# Patient Record
Sex: FEMALE | Race: BLACK OR AFRICAN AMERICAN | NOT HISPANIC OR LATINO | Employment: OTHER | URBAN - METROPOLITAN AREA
[De-identification: names, ages, dates, MRNs, and addresses within clinical notes are randomized per-mention and may not be internally consistent; named-entity substitution may affect disease eponyms.]

---

## 2023-03-24 PROBLEM — M25.551 HIP PAIN, RIGHT: Status: ACTIVE | Noted: 2023-03-24

## 2023-03-24 PROBLEM — T78.40XA ALLERGIC REACTION: Status: ACTIVE | Noted: 2023-03-24

## 2023-03-24 PROBLEM — H25.813 MIXED TYPE AGE-RELATED CATARACT, BOTH EYES: Status: ACTIVE | Noted: 2023-03-24

## 2023-03-24 PROBLEM — K64.5 THROMBOSED EXTERNAL HEMORRHOIDS: Status: ACTIVE | Noted: 2023-03-24

## 2023-03-24 PROBLEM — M70.71 BURSITIS OF HIP, RIGHT: Status: ACTIVE | Noted: 2023-03-24

## 2023-03-24 PROBLEM — E78.5 HYPERLIPIDEMIA: Status: ACTIVE | Noted: 2023-03-24

## 2023-03-24 PROBLEM — J45.909 ASTHMA (HHS-HCC): Status: ACTIVE | Noted: 2023-03-24

## 2023-03-24 PROBLEM — N39.0 UTI (URINARY TRACT INFECTION): Status: ACTIVE | Noted: 2023-03-24

## 2023-03-24 PROBLEM — D72.829 LEUKOCYTOSIS: Status: ACTIVE | Noted: 2023-03-24

## 2023-03-24 PROBLEM — M25.569 JOINT PAIN, KNEE: Status: ACTIVE | Noted: 2023-03-24

## 2023-03-24 PROBLEM — Z96.659 S/P TOTAL KNEE REPLACEMENT: Status: ACTIVE | Noted: 2023-03-24

## 2023-03-24 PROBLEM — M47.816 DEGENERATIVE JOINT DISEASE (DJD) OF LUMBAR SPINE: Status: ACTIVE | Noted: 2023-03-24

## 2023-03-24 PROBLEM — Z98.41 CATARACT EXTRACTION STATUS OF RIGHT EYE: Status: ACTIVE | Noted: 2023-03-24

## 2023-03-24 PROBLEM — M79.605 PAIN IN BOTH LOWER EXTREMITIES: Status: ACTIVE | Noted: 2023-03-24

## 2023-03-24 PROBLEM — M81.0 OSTEOPOROSIS, SENILE: Status: ACTIVE | Noted: 2023-03-24

## 2023-03-24 PROBLEM — N18.31 CHRONIC KIDNEY DISEASE, STAGE 3A (MULTI): Status: ACTIVE | Noted: 2023-03-24

## 2023-03-24 PROBLEM — R73.9 HYPERGLYCEMIA: Status: ACTIVE | Noted: 2023-03-24

## 2023-03-24 PROBLEM — E21.0 HYPERPARATHYROIDISM, PRIMARY (MULTI): Status: ACTIVE | Noted: 2023-03-24

## 2023-03-24 PROBLEM — E83.52 HYPERCALCEMIA: Status: ACTIVE | Noted: 2023-03-24

## 2023-03-24 PROBLEM — M19.90 ARTHRITIS: Status: ACTIVE | Noted: 2023-03-24

## 2023-03-24 PROBLEM — R42 DIZZINESS: Status: ACTIVE | Noted: 2023-03-24

## 2023-03-24 PROBLEM — H91.90 HEARING LOSS: Status: ACTIVE | Noted: 2023-03-24

## 2023-03-24 PROBLEM — B96.81 HELICOBACTER PYLORI GASTRITIS: Status: ACTIVE | Noted: 2023-03-24

## 2023-03-24 PROBLEM — M25.561 RIGHT KNEE PAIN: Status: ACTIVE | Noted: 2023-03-24

## 2023-03-24 PROBLEM — Z98.42 CATARACT EXTRACTION STATUS OF LEFT EYE: Status: ACTIVE | Noted: 2023-03-24

## 2023-03-24 PROBLEM — M79.604 PAIN IN BOTH LOWER EXTREMITIES: Status: ACTIVE | Noted: 2023-03-24

## 2023-03-24 PROBLEM — R10.9 ABDOMINAL PAIN: Status: ACTIVE | Noted: 2023-03-24

## 2023-03-24 PROBLEM — E66.01 CLASS 2 SEVERE OBESITY DUE TO EXCESS CALORIES WITH SERIOUS COMORBIDITY IN ADULT (MULTI): Status: ACTIVE | Noted: 2023-03-24

## 2023-03-24 PROBLEM — I12.9 STAGE 2 CHRONIC KIDNEY DISEASE DUE TO BENIGN HYPERTENSION: Status: ACTIVE | Noted: 2023-03-24

## 2023-03-24 PROBLEM — K21.9 ESOPHAGEAL REFLUX: Status: ACTIVE | Noted: 2023-03-24

## 2023-03-24 PROBLEM — Z96.1 PSEUDOPHAKIA OF BOTH EYES: Status: ACTIVE | Noted: 2023-03-24

## 2023-03-24 PROBLEM — R92.1 BREAST CALCIFICATION, LEFT: Status: ACTIVE | Noted: 2023-03-24

## 2023-03-24 PROBLEM — N18.2 STAGE 2 CHRONIC KIDNEY DISEASE DUE TO BENIGN HYPERTENSION: Status: ACTIVE | Noted: 2023-03-24

## 2023-03-24 PROBLEM — I10 ESSENTIAL HYPERTENSION: Status: ACTIVE | Noted: 2023-03-24

## 2023-03-24 PROBLEM — Z97.3 WEARS GLASSES: Status: ACTIVE | Noted: 2023-03-24

## 2023-03-24 PROBLEM — D64.9 ANEMIA: Status: ACTIVE | Noted: 2023-03-24

## 2023-03-24 PROBLEM — M25.562 LEFT KNEE PAIN: Status: ACTIVE | Noted: 2023-03-24

## 2023-03-24 PROBLEM — R79.9 ABNORMAL BLOOD CHEMISTRY: Status: ACTIVE | Noted: 2023-03-24

## 2023-03-24 PROBLEM — E55.9 VITAMIN D DEFICIENCY: Status: ACTIVE | Noted: 2023-03-24

## 2023-03-24 PROBLEM — H93.8X1 FULLNESS IN EAR, RIGHT: Status: ACTIVE | Noted: 2023-03-24

## 2023-03-24 PROBLEM — R70.0 ELEVATED ERYTHROCYTE SEDIMENTATION RATE: Status: ACTIVE | Noted: 2023-03-24

## 2023-03-24 PROBLEM — H90.3 BILATERAL SENSORINEURAL HEARING LOSS: Status: ACTIVE | Noted: 2023-03-24

## 2023-03-24 PROBLEM — R19.7 DIARRHEA: Status: ACTIVE | Noted: 2023-03-24

## 2023-03-24 PROBLEM — H04.123 DRY EYES: Status: ACTIVE | Noted: 2023-03-24

## 2023-03-24 PROBLEM — E66.812 CLASS 2 SEVERE OBESITY DUE TO EXCESS CALORIES WITH SERIOUS COMORBIDITY IN ADULT: Status: ACTIVE | Noted: 2023-03-24

## 2023-03-24 PROBLEM — H52.7 REFRACTIVE ERROR: Status: ACTIVE | Noted: 2023-03-24

## 2023-03-24 PROBLEM — R26.81 UNSTEADY GAIT: Status: ACTIVE | Noted: 2023-03-24

## 2023-03-24 PROBLEM — M17.0 DEGENERATIVE ARTHRITIS OF KNEE, BILATERAL: Status: ACTIVE | Noted: 2023-03-24

## 2023-03-24 PROBLEM — H53.8 BLURRED VISION, BILATERAL: Status: ACTIVE | Noted: 2023-03-24

## 2023-03-24 PROBLEM — R60.9 EDEMA: Status: ACTIVE | Noted: 2023-03-24

## 2023-03-24 PROBLEM — R13.10 DYSPHAGIA: Status: ACTIVE | Noted: 2023-03-24

## 2023-03-24 PROBLEM — R13.10 FOOD STICKS ON SWALLOWING: Status: ACTIVE | Noted: 2023-03-24

## 2023-03-24 PROBLEM — K29.70 HELICOBACTER PYLORI GASTRITIS: Status: ACTIVE | Noted: 2023-03-24

## 2023-03-24 PROBLEM — K56.609 SBO (SMALL BOWEL OBSTRUCTION) (MULTI): Status: ACTIVE | Noted: 2023-03-24

## 2023-03-24 PROBLEM — R74.8 ELEVATED ALKALINE PHOSPHATASE LEVEL: Status: ACTIVE | Noted: 2023-03-24

## 2023-03-24 RX ORDER — PRAVASTATIN SODIUM 40 MG/1
1 TABLET ORAL NIGHTLY
COMMUNITY
Start: 2012-10-18 | End: 2023-05-12

## 2023-03-24 RX ORDER — AMLODIPINE BESYLATE 10 MG/1
1 TABLET ORAL DAILY
COMMUNITY
Start: 2021-11-09 | End: 2023-06-06

## 2023-03-24 RX ORDER — ALBUTEROL SULFATE 90 UG/1
1-2 AEROSOL, METERED RESPIRATORY (INHALATION)
COMMUNITY
Start: 2012-07-05 | End: 2023-10-20

## 2023-03-24 RX ORDER — OLMESARTAN MEDOXOMIL 40 MG/1
40 TABLET ORAL DAILY
COMMUNITY
Start: 2021-10-12 | End: 2023-07-24 | Stop reason: SDUPTHER

## 2023-03-24 RX ORDER — HYDROCORTISONE 25 MG/G
CREAM TOPICAL
COMMUNITY
Start: 2021-03-26 | End: 2023-03-27 | Stop reason: ALTCHOICE

## 2023-03-24 RX ORDER — ASPIRIN 81 MG/1
1 TABLET ORAL DAILY
COMMUNITY

## 2023-03-24 RX ORDER — FUROSEMIDE 40 MG/1
1 TABLET ORAL 2 TIMES DAILY
COMMUNITY
Start: 2020-12-16 | End: 2023-10-27 | Stop reason: SDUPTHER

## 2023-03-27 ENCOUNTER — OFFICE VISIT (OUTPATIENT)
Dept: PRIMARY CARE | Facility: CLINIC | Age: 88
End: 2023-03-27
Payer: MEDICARE

## 2023-03-27 VITALS
OXYGEN SATURATION: 97 % | HEART RATE: 95 BPM | DIASTOLIC BLOOD PRESSURE: 80 MMHG | SYSTOLIC BLOOD PRESSURE: 164 MMHG | BODY MASS INDEX: 44.7 KG/M2 | HEIGHT: 62 IN

## 2023-03-27 DIAGNOSIS — Z00.00 MEDICARE ANNUAL WELLNESS VISIT, SUBSEQUENT: Primary | ICD-10-CM

## 2023-03-27 DIAGNOSIS — E66.01 MORBID OBESITY WITH BMI OF 40.0-44.9, ADULT (MULTI): ICD-10-CM

## 2023-03-27 DIAGNOSIS — Z13.89 ENCOUNTER FOR SCREENING FOR OTHER DISORDER: ICD-10-CM

## 2023-03-27 DIAGNOSIS — R35.0 URINARY FREQUENCY: ICD-10-CM

## 2023-03-27 DIAGNOSIS — E21.0 HYPERPARATHYROIDISM, PRIMARY (MULTI): ICD-10-CM

## 2023-03-27 DIAGNOSIS — D64.9 ANEMIA, UNSPECIFIED TYPE: ICD-10-CM

## 2023-03-27 DIAGNOSIS — Z78.0 POSTMENOPAUSAL STATE: ICD-10-CM

## 2023-03-27 DIAGNOSIS — I10 ESSENTIAL HYPERTENSION: ICD-10-CM

## 2023-03-27 DIAGNOSIS — Z00.00 ROUTINE GENERAL MEDICAL EXAMINATION AT HEALTH CARE FACILITY: ICD-10-CM

## 2023-03-27 DIAGNOSIS — R74.8 ELEVATED ALKALINE PHOSPHATASE LEVEL: ICD-10-CM

## 2023-03-27 PROBLEM — K56.609 SBO (SMALL BOWEL OBSTRUCTION) (MULTI): Status: RESOLVED | Noted: 2023-03-24 | Resolved: 2023-03-27

## 2023-03-27 PROBLEM — E66.812 CLASS 2 SEVERE OBESITY DUE TO EXCESS CALORIES WITH SERIOUS COMORBIDITY IN ADULT: Status: RESOLVED | Noted: 2023-03-24 | Resolved: 2023-03-27

## 2023-03-27 PROBLEM — M79.604 PAIN IN BOTH LOWER EXTREMITIES: Status: RESOLVED | Noted: 2023-03-24 | Resolved: 2023-03-27

## 2023-03-27 PROBLEM — M79.605 PAIN IN BOTH LOWER EXTREMITIES: Status: RESOLVED | Noted: 2023-03-24 | Resolved: 2023-03-27

## 2023-03-27 PROBLEM — N18.31 CHRONIC KIDNEY DISEASE, STAGE 3A (MULTI): Status: RESOLVED | Noted: 2023-03-24 | Resolved: 2023-03-27

## 2023-03-27 PROBLEM — M81.0 OSTEOPOROSIS, SENILE: Status: RESOLVED | Noted: 2023-03-24 | Resolved: 2023-03-27

## 2023-03-27 PROBLEM — N39.0 UTI (URINARY TRACT INFECTION): Status: RESOLVED | Noted: 2023-03-24 | Resolved: 2023-03-27

## 2023-03-27 PROCEDURE — 99397 PER PM REEVAL EST PAT 65+ YR: CPT | Performed by: PHYSICIAN ASSISTANT

## 2023-03-27 PROCEDURE — 1036F TOBACCO NON-USER: CPT | Performed by: PHYSICIAN ASSISTANT

## 2023-03-27 PROCEDURE — 1123F ACP DISCUSS/DSCN MKR DOCD: CPT | Performed by: PHYSICIAN ASSISTANT

## 2023-03-27 PROCEDURE — 1033F TOBACCO NONSMOKER NOR 2NDHND: CPT | Performed by: PHYSICIAN ASSISTANT

## 2023-03-27 PROCEDURE — G0439 PPPS, SUBSEQ VISIT: HCPCS | Performed by: PHYSICIAN ASSISTANT

## 2023-03-27 PROCEDURE — 85025 COMPLETE CBC W/AUTO DIFF WBC: CPT

## 2023-03-27 PROCEDURE — 1170F FXNL STATUS ASSESSED: CPT | Performed by: PHYSICIAN ASSISTANT

## 2023-03-27 PROCEDURE — 1160F RVW MEDS BY RX/DR IN RCRD: CPT | Performed by: PHYSICIAN ASSISTANT

## 2023-03-27 PROCEDURE — 36415 COLL VENOUS BLD VENIPUNCTURE: CPT

## 2023-03-27 PROCEDURE — G0444 DEPRESSION SCREEN ANNUAL: HCPCS | Performed by: PHYSICIAN ASSISTANT

## 2023-03-27 PROCEDURE — 3077F SYST BP >= 140 MM HG: CPT | Performed by: PHYSICIAN ASSISTANT

## 2023-03-27 PROCEDURE — 80053 COMPREHEN METABOLIC PANEL: CPT

## 2023-03-27 PROCEDURE — 84080 ASSAY ALKALINE PHOSPHATASES: CPT

## 2023-03-27 PROCEDURE — 3079F DIAST BP 80-89 MM HG: CPT | Performed by: PHYSICIAN ASSISTANT

## 2023-03-27 PROCEDURE — 1159F MED LIST DOCD IN RCRD: CPT | Performed by: PHYSICIAN ASSISTANT

## 2023-03-27 ASSESSMENT — ENCOUNTER SYMPTOMS
LOSS OF SENSATION IN FEET: 1
OCCASIONAL FEELINGS OF UNSTEADINESS: 1
DEPRESSION: 0

## 2023-03-27 ASSESSMENT — ACTIVITIES OF DAILY LIVING (ADL)
MANAGING_FINANCES: TOTAL CARE
TAKING_MEDICATION: INDEPENDENT
BATHING: NEEDS ASSISTANCE
DOING_HOUSEWORK: TOTAL CARE
GROCERY_SHOPPING: TOTAL CARE
DRESSING: NEEDS ASSISTANCE

## 2023-03-27 ASSESSMENT — PATIENT HEALTH QUESTIONNAIRE - PHQ9
1. LITTLE INTEREST OR PLEASURE IN DOING THINGS: NOT AT ALL
SUM OF ALL RESPONSES TO PHQ9 QUESTIONS 1 AND 2: 0
2. FEELING DOWN, DEPRESSED OR HOPELESS: NOT AT ALL

## 2023-03-27 NOTE — PROGRESS NOTES
Subjective   Reason for Visit: Kelly Haji is an 88 y.o. female here for a Medicare Wellness visit.   Past Medical, Surgical, and Family History reviewed and updated in chart.         HPI 87 year old female presenting with her daughter for Medicare wellness / CPE visit. Generally she's doing okay. She is c/o:    1. Headache: Present for a few days, described as a vague, annoying headache. It was gradual in onset, mainly right sided. 5-6/10 in severity at its worst, not a thunderclap HA or worst HA of her life. She admits to having some associated sinus congestion and pressure, but no rhinorrhea. She took a few extra strength Tylenol this AM, it did provide minimal relief. She also did try Advil cold & sinus, which provided even better relief than Tylenol. No changes in vision or hearing, facial drooping, speech slurring, gait disturbance, nausea, photophobia or phonophobia.     2. Urinary frequency, urinary urgency, and burning with urination: present for the past 2-3 days. No other UTI symptoms: fevers, chills, pelvic pressure, hematuria, dysuria.     HTN/HLD: Compliant with amlodipine 10 mg, olmesartan 40 mg, furosemide 40 mg, pravastatin 40 mg, 81 mg ASA.  She does not check BP at home.  Denies any headache, dizziness, chest pain, SOB/KING. She is not eating a lot of snacks, soups, or processed foods at home.     Edema: On furosemide 40 mg, but is not always compliant with taking it 2x daily.  Follows with cardiology, last seen 1/18/2023.  No med changes or testing ordered.    Hyperparathyroidism: Labs 11/7/2022 showed hypercalcemia (11.4) and elevated alkaline phosphatase.  Not currently following with endocrinology, last seen 2019.  At that time it was recommended that she increase hydration, take Lasix to help bring down hypocalcemia, and continue monitoring.  Last DEXA 2019 showed normal bone density, no history of osteopenia or osteoporosis.    Health maintenance:  Immunizations:  -Flu: UTD  -COVID:  "Received 3 doses  -Pneumococcal: UTD  -Shingrix: Recommended, obtain from local pharmacy  DEXA due (last 5/29/2019)- ordered 3/27/23. Alk phos level and calcium have been elevated     Patient Care Team:  Melba Reich PA-C as PCP - General  Melba Reich PA-C as PCP - [employer] PCP     Objective   Vitals:  /80   Pulse 95   Ht 1.575 m (5' 2\")   SpO2 97%   BMI 44.70 kg/m²       Physical Exam  Vitals reviewed.   Constitutional:       General: She is not in acute distress.     Appearance: Normal appearance. She is not ill-appearing.   HENT:      Head: Normocephalic and atraumatic.   Eyes:      General: No scleral icterus.     Extraocular Movements: Extraocular movements intact.      Conjunctiva/sclera: Conjunctivae normal.      Pupils: Pupils are equal, round, and reactive to light.   Cardiovascular:      Rate and Rhythm: Normal rate and regular rhythm.      Heart sounds: Normal heart sounds. No murmur heard.     No friction rub. No gallop.   Pulmonary:      Effort: Pulmonary effort is normal. No respiratory distress.      Breath sounds: Normal breath sounds. No stridor. No wheezing, rhonchi or rales.   Musculoskeletal:      Cervical back: Normal range of motion.      Right lower leg: No edema.      Left lower leg: No edema.      Comments: Resting in wheelchair   Skin:     General: Skin is warm and dry.   Neurological:      Mental Status: She is alert and oriented to person, place, and time. Mental status is at baseline.      Cranial Nerves: No cranial nerve deficit.      Gait: Gait normal.   Psychiatric:         Mood and Affect: Mood normal.         Behavior: Behavior normal.         Assessment/Plan   Problem List Items Addressed This Visit       Anemia    Current Assessment & Plan     Stable.  Encouraged iron 65 mg or ferrous sulfate 325 mg supplement once daily.  Take OTC stool softener if constipation occurs with supplementation.         Relevant Orders    CBC and Auto Differential " (Completed)    Elevated alkaline phosphatase level    Current Assessment & Plan     Elevated alk phos, normal GGT, elevated calcium.  Bone specific alkaline phosphatase and DEXA bone scan ordered         Essential hypertension    Current Assessment & Plan     BP above goal at 164/80 and on recheck at 152/80.  Continue amlodipine 10 mg, olmesartan 40 mg, furosemide 40 mg.  Instructed to take furosemide more regularly.  Encourage strict DASH diet, drinking adequate water, and regular exercise.  Follow-up with cardiology         Relevant Orders    Comprehensive metabolic panel (Completed)    Hyperparathyroidism, primary (CMS/[facility])    Current Assessment & Plan     CMP, bone specific alkaline phosphatase, DEXA bone scan ordered.  Referral to endocrinology will be placed if elevations persist         Urinary frequency    Current Assessment & Plan     Urinalysis and urine culture ordered.  We will treat as clinically indicated.  Drink adequate water         Relevant Orders    Urinalysis with Reflex Microscopic    Urine Culture     Other Visit Diagnoses       Medicare annual wellness visit, subsequent    -  Primary    Routine general medical examination at health care facility        Encounter for screening for other disorder        Postmenopausal state        Relevant Orders    XR DEXA bone density    Morbid obesity with BMI of 40.0-44.9, adult (CMS/[facility])

## 2023-03-28 DIAGNOSIS — R74.8 HIGH SERUM BONE-SPECIFIC ALKALINE PHOSPHATASE: Primary | ICD-10-CM

## 2023-03-28 DIAGNOSIS — E83.52 HYPERCALCEMIA: ICD-10-CM

## 2023-03-28 LAB
ALANINE AMINOTRANSFERASE (SGPT) (U/L) IN SER/PLAS: 11 U/L (ref 7–45)
ALBUMIN (G/DL) IN SER/PLAS: 4.3 G/DL (ref 3.4–5)
ALKALINE PHOSPHATASE (U/L) IN SER/PLAS: 175 U/L (ref 33–136)
ANION GAP IN SER/PLAS: 13 MMOL/L (ref 10–20)
ASPARTATE AMINOTRANSFERASE (SGOT) (U/L) IN SER/PLAS: 11 U/L (ref 9–39)
BASOPHILS (10*3/UL) IN BLOOD BY AUTOMATED COUNT: 0.04 X10E9/L (ref 0–0.1)
BASOPHILS/100 LEUKOCYTES IN BLOOD BY AUTOMATED COUNT: 0.4 % (ref 0–2)
BILIRUBIN TOTAL (MG/DL) IN SER/PLAS: 0.3 MG/DL (ref 0–1.2)
CALCIUM (MG/DL) IN SER/PLAS: 11.5 MG/DL (ref 8.6–10.6)
CARBON DIOXIDE, TOTAL (MMOL/L) IN SER/PLAS: 26 MMOL/L (ref 21–32)
CHLORIDE (MMOL/L) IN SER/PLAS: 106 MMOL/L (ref 98–107)
CREATININE (MG/DL) IN SER/PLAS: 0.82 MG/DL (ref 0.5–1.05)
EOSINOPHILS (10*3/UL) IN BLOOD BY AUTOMATED COUNT: 0.2 X10E9/L (ref 0–0.4)
EOSINOPHILS/100 LEUKOCYTES IN BLOOD BY AUTOMATED COUNT: 1.9 % (ref 0–6)
ERYTHROCYTE DISTRIBUTION WIDTH (RATIO) BY AUTOMATED COUNT: 17.5 % (ref 11.5–14.5)
ERYTHROCYTE MEAN CORPUSCULAR HEMOGLOBIN CONCENTRATION (G/DL) BY AUTOMATED: 32.4 G/DL (ref 32–36)
ERYTHROCYTE MEAN CORPUSCULAR VOLUME (FL) BY AUTOMATED COUNT: 72 FL (ref 80–100)
ERYTHROCYTES (10*6/UL) IN BLOOD BY AUTOMATED COUNT: 4.92 X10E12/L (ref 4–5.2)
GFR FEMALE: 70 ML/MIN/1.73M2
GLUCOSE (MG/DL) IN SER/PLAS: 99 MG/DL (ref 74–99)
HEMATOCRIT (%) IN BLOOD BY AUTOMATED COUNT: 35.5 % (ref 36–46)
HEMOGLOBIN (G/DL) IN BLOOD: 11.5 G/DL (ref 12–16)
IMMATURE GRANULOCYTES/100 LEUKOCYTES IN BLOOD BY AUTOMATED COUNT: 0.3 % (ref 0–0.9)
LEUKOCYTES (10*3/UL) IN BLOOD BY AUTOMATED COUNT: 10.5 X10E9/L (ref 4.4–11.3)
LYMPHOCYTES (10*3/UL) IN BLOOD BY AUTOMATED COUNT: 2.29 X10E9/L (ref 0.8–3)
LYMPHOCYTES/100 LEUKOCYTES IN BLOOD BY AUTOMATED COUNT: 21.9 % (ref 13–44)
MONOCYTES (10*3/UL) IN BLOOD BY AUTOMATED COUNT: 0.55 X10E9/L (ref 0.05–0.8)
MONOCYTES/100 LEUKOCYTES IN BLOOD BY AUTOMATED COUNT: 5.3 % (ref 2–10)
NEUTROPHILS (10*3/UL) IN BLOOD BY AUTOMATED COUNT: 7.36 X10E9/L (ref 1.6–5.5)
NEUTROPHILS/100 LEUKOCYTES IN BLOOD BY AUTOMATED COUNT: 70.2 % (ref 40–80)
NRBC (PER 100 WBCS) BY AUTOMATED COUNT: 0 /100 WBC (ref 0–0)
PLATELETS (10*3/UL) IN BLOOD AUTOMATED COUNT: 379 X10E9/L (ref 150–450)
POTASSIUM (MMOL/L) IN SER/PLAS: 4.4 MMOL/L (ref 3.5–5.3)
PROTEIN TOTAL: 7.5 G/DL (ref 6.4–8.2)
SODIUM (MMOL/L) IN SER/PLAS: 141 MMOL/L (ref 136–145)
UREA NITROGEN (MG/DL) IN SER/PLAS: 10 MG/DL (ref 6–23)

## 2023-03-29 PROBLEM — R35.0 URINARY FREQUENCY: Status: ACTIVE | Noted: 2023-03-29

## 2023-03-29 PROBLEM — R51.9 HEADACHE DISORDER: Status: ACTIVE | Noted: 2023-03-29

## 2023-03-29 PROBLEM — G44.209 ACUTE TENSION-TYPE HEADACHE: Status: ACTIVE | Noted: 2023-03-29

## 2023-03-29 NOTE — ASSESSMENT & PLAN NOTE
Urinalysis and urine culture ordered.  We will treat as clinically indicated.  Drink adequate water

## 2023-03-29 NOTE — ASSESSMENT & PLAN NOTE
Elevated alk phos, normal GGT, elevated calcium.  Bone specific alkaline phosphatase and DEXA bone scan ordered

## 2023-03-29 NOTE — ASSESSMENT & PLAN NOTE
Stable.  Encouraged iron 65 mg or ferrous sulfate 325 mg supplement once daily.  Take OTC stool softener if constipation occurs with supplementation.

## 2023-03-29 NOTE — ASSESSMENT & PLAN NOTE
BP above goal at 164/80 and on recheck at 152/80.  Continue amlodipine 10 mg, olmesartan 40 mg, furosemide 40 mg.  Instructed to take furosemide more regularly.  Encourage strict DASH diet, drinking adequate water, and regular exercise.  Follow-up with cardiology

## 2023-03-29 NOTE — ASSESSMENT & PLAN NOTE
CMP, bone specific alkaline phosphatase, DEXA bone scan ordered.  Referral to endocrinology will be placed if elevations persist

## 2023-03-29 NOTE — ASSESSMENT & PLAN NOTE
Continue with Tylenol and ibuprofen as needed for headache.  Discussed strict precautions and to go to the ED if any neurodeficits occur.

## 2023-03-29 NOTE — ASSESSMENT & PLAN NOTE
Continue furosemide 40 mg twice daily.  Encourage compliance with taking furosemide.  Elevate lower extremities when not ambulating.  Been using compression stockings.  Cut back on any salt intake.

## 2023-04-01 LAB — BONE SPECIFIC ALKALINE PHOSPHATASE: 33.6 UG/L

## 2023-04-03 NOTE — RESULT ENCOUNTER NOTE
Labs show elevated calcium and bone-specific alkaline phosphatase. This could indicate a metabolic bone disorder. She needs to complete the DEXA bone scan, which was ordered at her last [facility]. I also want her to see a Rheumatologist for further evaluation. Increase water intake and take deep breaths.     CBC also showed mild iron deficiency anemia. Is she taking an iron supplement? If not, begin doing to. Take a stool softner with it if constipation occurs.

## 2023-04-04 ENCOUNTER — TELEPHONE (OUTPATIENT)
Dept: PRIMARY CARE | Facility: CLINIC | Age: 88
End: 2023-04-04
Payer: MEDICARE

## 2023-04-04 NOTE — TELEPHONE ENCOUNTER
Pt informed of results.    ----- Message from Melba Reich PA-C sent at 4/3/2023  4:43 PM EDT -----  Labs show elevated calcium and bone-specific alkaline phosphatase. This could indicate a metabolic bone disorder. She needs to complete the DEXA bone scan, which was ordered at her last vanessa. I also want her to see a Rheumatologist for further evaluation. Increase water intake and take deep breaths.     CBC also showed mild iron deficiency anemia. Is she taking an iron supplement? If not, begin doing to. Take a stool softner with it if constipation occurs.

## 2023-05-11 DIAGNOSIS — E78.5 HYPERLIPIDEMIA, UNSPECIFIED HYPERLIPIDEMIA TYPE: Primary | ICD-10-CM

## 2023-05-12 RX ORDER — PRAVASTATIN SODIUM 40 MG/1
TABLET ORAL
Qty: 90 TABLET | Refills: 0 | Status: SHIPPED | OUTPATIENT
Start: 2023-05-12 | End: 2023-08-10

## 2023-06-02 DIAGNOSIS — I10 ESSENTIAL HYPERTENSION: Primary | ICD-10-CM

## 2023-06-06 RX ORDER — AMLODIPINE BESYLATE 10 MG/1
TABLET ORAL
Qty: 90 TABLET | Refills: 0 | Status: SHIPPED | OUTPATIENT
Start: 2023-06-06 | End: 2023-07-24 | Stop reason: SDUPTHER

## 2023-06-13 ENCOUNTER — APPOINTMENT (OUTPATIENT)
Dept: PRIMARY CARE | Facility: CLINIC | Age: 88
End: 2023-06-13
Payer: MEDICARE

## 2023-06-30 ENCOUNTER — APPOINTMENT (OUTPATIENT)
Dept: PRIMARY CARE | Facility: CLINIC | Age: 88
End: 2023-06-30
Payer: MEDICARE

## 2023-07-24 ENCOUNTER — OFFICE VISIT (OUTPATIENT)
Dept: PRIMARY CARE | Facility: CLINIC | Age: 88
End: 2023-07-24
Payer: MEDICARE

## 2023-07-24 VITALS — SYSTOLIC BLOOD PRESSURE: 135 MMHG | HEART RATE: 93 BPM | OXYGEN SATURATION: 95 % | DIASTOLIC BLOOD PRESSURE: 67 MMHG

## 2023-07-24 DIAGNOSIS — E78.2 MIXED HYPERLIPIDEMIA: ICD-10-CM

## 2023-07-24 DIAGNOSIS — R73.9 HYPERGLYCEMIA: ICD-10-CM

## 2023-07-24 DIAGNOSIS — E21.0 HYPERPARATHYROIDISM, PRIMARY (MULTI): ICD-10-CM

## 2023-07-24 DIAGNOSIS — D64.9 ANEMIA, UNSPECIFIED TYPE: ICD-10-CM

## 2023-07-24 DIAGNOSIS — R60.0 LOWER EXTREMITY EDEMA: Primary | ICD-10-CM

## 2023-07-24 DIAGNOSIS — I10 ESSENTIAL HYPERTENSION: ICD-10-CM

## 2023-07-24 PROCEDURE — 1160F RVW MEDS BY RX/DR IN RCRD: CPT | Performed by: PHYSICIAN ASSISTANT

## 2023-07-24 PROCEDURE — 99213 OFFICE O/P EST LOW 20 MIN: CPT | Performed by: PHYSICIAN ASSISTANT

## 2023-07-24 PROCEDURE — 1036F TOBACCO NON-USER: CPT | Performed by: PHYSICIAN ASSISTANT

## 2023-07-24 PROCEDURE — 1159F MED LIST DOCD IN RCRD: CPT | Performed by: PHYSICIAN ASSISTANT

## 2023-07-24 PROCEDURE — 3078F DIAST BP <80 MM HG: CPT | Performed by: PHYSICIAN ASSISTANT

## 2023-07-24 PROCEDURE — 1126F AMNT PAIN NOTED NONE PRSNT: CPT | Performed by: PHYSICIAN ASSISTANT

## 2023-07-24 PROCEDURE — 3075F SYST BP GE 130 - 139MM HG: CPT | Performed by: PHYSICIAN ASSISTANT

## 2023-07-24 RX ORDER — AMLODIPINE BESYLATE 10 MG/1
10 TABLET ORAL DAILY
Qty: 90 TABLET | Refills: 1 | Status: SHIPPED | OUTPATIENT
Start: 2023-07-24 | End: 2024-03-04 | Stop reason: SDUPTHER

## 2023-07-24 RX ORDER — OLMESARTAN MEDOXOMIL 40 MG/1
40 TABLET ORAL DAILY
Qty: 90 TABLET | Refills: 1 | Status: SHIPPED | OUTPATIENT
Start: 2023-07-24 | End: 2024-02-05 | Stop reason: SDUPTHER

## 2023-07-24 NOTE — ASSESSMENT & PLAN NOTE
Continue Furosemide 40 twice daily. Continue leg elevation, compression stockings, and home PT. Encourage low-sodium diet. Follow with Cardiology.

## 2023-07-24 NOTE — PROGRESS NOTES
Subjective   Patient ID: Kelly Haji is a 88 y.o. female who presents for 4 month follow up.    HPI 89 yo female presenting with her daughter for a follow-up visit.  Overall she is doing well.    Bilateral leg edema: Taking Furosemide 40 mg twice daily. Pt is still having edema despite Furosemide and leg elevation. Prescription measured compression stockings and home PT (2x per week) ordered by Dr. Shipley. If edema still persists after PT, lymphedema pumps will be ordered.     HTN/HLD: Stable. Compliant with Amlodipine 10 mg, Olmesartan 40 mg, Pravastatin 10 mg, and ASA 81 mg. She does not monitor BP at home.  Denies any headache, dizziness, chest pain, SOB/KING, palpitations.     Hyperparathyroidism: Labs 3/27/23 showed hypercalcemia (11.5) and elevated alkaline phosphatase (175). Not currently following with endocrinology, last seen 2019.  At that time it was recommended that she increase hydration, take Lasix to help bring down hypocalcemia, and continue monitoring.  Last DEXA 2019 showed normal bone density, no history of osteopenia or osteoporosis.    B/k knee OA: She has had numerous treatments in the past including muscle relaxers, opioid pain revealers, Gabapentin, joint injections, Diclofenac gel, Meloxicam, Tramadol and yet nothing has provided relief to the patient. She has also had a total right knee replacement, which per patient, still gives her pain. She saw Pain Management via a virtual visit once on 4/23/20, however she has not followed up since then. At home she ambulates with use of a walker. She is asking for something for pain stronger than tylenol, explained to her that our office doesn't prescribe opioid therapies.      Health maintenance:  Immunizations:  -Flu: UTD  -Pneumococcal: UTD  -Shingrix: recommended from pharmacy  -Tdap: recommended from pharmacy  DEXA due (last 5/29/2019)- ordered 3/27/23. Alk phos level and calcium have been elevated     Last MCR/ CPE: 3/27/23 (MCR ADV)    12  points ROS was reviewed and negative except for those noted in the HPI.     Objective   /67   Pulse 93   SpO2 95%     Physical Exam  Vitals reviewed.   Constitutional:       General: She is not in acute distress.     Appearance: Normal appearance. She is not ill-appearing.   HENT:      Head: Normocephalic and atraumatic.   Eyes:      General: No scleral icterus.     Extraocular Movements: Extraocular movements intact.      Conjunctiva/sclera: Conjunctivae normal.      Pupils: Pupils are equal, round, and reactive to light.   Cardiovascular:      Rate and Rhythm: Normal rate and regular rhythm.      Heart sounds: Normal heart sounds. No murmur heard.     No friction rub. No gallop.   Pulmonary:      Effort: Pulmonary effort is normal. No respiratory distress.      Breath sounds: Normal breath sounds. No stridor. No wheezing, rhonchi or rales.   Musculoskeletal:      Cervical back: Normal range of motion.      Right lower leg: Edema (2+ pitting) present.      Left lower leg: Edema (2+ pitting) present.   Skin:     General: Skin is warm and dry.   Neurological:      Mental Status: She is alert and oriented to person, place, and time. Mental status is at baseline.      Cranial Nerves: No cranial nerve deficit.      Gait: Gait normal.   Psychiatric:         Mood and Affect: Mood normal.         Behavior: Behavior normal.         Assessment/Plan   Problem List Items Addressed This Visit       Anemia     Stable. Encouraged iron 65 mg or ferrous sulfate 325 mg supplement once daily. Take OTC stool softener if constipation occurs with supplementation. CBC w/ diff, ferritin, iron and TIBC ordered.         Relevant Orders    Iron and TIBC    Ferritin    Essential hypertension     Stable. Continue Amlodipine 10 mg, Olmesartan 40 mg, and ASA 81 mg. Recommend DASH diet.          Relevant Medications    olmesartan (BENIcar) 40 mg tablet    amLODIPine (Norvasc) 10 mg tablet    Other Relevant Orders    Comprehensive  metabolic panel    CBC and Auto Differential    Hyperglycemia     HbA1c 6.0% (8/11/22). HbA1c ordered. Recommend ADA diet.          Relevant Orders    Hemoglobin A1c    Hyperlipidemia     Stable. Continue Pravastatin 10 mg. Recommend Mediterranean diet. Lipid panel ordered.         Relevant Orders    Lipid panel    Hyperparathyroidism, primary (CMS/HCC)     Elevated calcium 11.5 (3/27/23) and elevated alkaline phosphatase 175 (3/27/23). CMP ordered.         Lower extremity edema - Primary     Continue Furosemide 40 twice daily. Continue leg elevation, compression stockings, and home PT. Encourage low-sodium diet. Follow with Cardiology.              Follow up in 4-5 months or sooner as needed

## 2023-07-24 NOTE — ASSESSMENT & PLAN NOTE
Stable. Encouraged iron 65 mg or ferrous sulfate 325 mg supplement once daily. Take OTC stool softener if constipation occurs with supplementation. CBC w/ diff, ferritin, iron and TIBC ordered.

## 2023-08-10 DIAGNOSIS — E78.5 HYPERLIPIDEMIA, UNSPECIFIED HYPERLIPIDEMIA TYPE: ICD-10-CM

## 2023-08-10 RX ORDER — PRAVASTATIN SODIUM 40 MG/1
TABLET ORAL
Qty: 90 TABLET | Refills: 1 | Status: SHIPPED | OUTPATIENT
Start: 2023-08-10 | End: 2024-02-13 | Stop reason: SDUPTHER

## 2023-08-29 ENCOUNTER — TELEPHONE (OUTPATIENT)
Dept: PRIMARY CARE | Facility: CLINIC | Age: 88
End: 2023-08-29
Payer: MEDICARE

## 2023-08-29 NOTE — TELEPHONE ENCOUNTER
Home care calling about pt bp was elevated 170/85, also pt had a fall and hit right knee, no injury. Also wants to know if you can write an rx for a hospital bed and mattress topper. So pt can get in and out of bed safely

## 2023-08-31 DIAGNOSIS — M25.551 HIP PAIN, RIGHT: ICD-10-CM

## 2023-08-31 DIAGNOSIS — R60.0 LOWER EXTREMITY EDEMA: ICD-10-CM

## 2023-08-31 DIAGNOSIS — M47.816 OSTEOARTHRITIS OF LUMBAR SPINE, UNSPECIFIED SPINAL OSTEOARTHRITIS COMPLICATION STATUS: Primary | ICD-10-CM

## 2023-10-20 DIAGNOSIS — J45.909 ASTHMA, UNSPECIFIED ASTHMA SEVERITY, UNSPECIFIED WHETHER COMPLICATED, UNSPECIFIED WHETHER PERSISTENT (HHS-HCC): Primary | ICD-10-CM

## 2023-10-20 RX ORDER — ALBUTEROL SULFATE 90 UG/1
AEROSOL, METERED RESPIRATORY (INHALATION)
Qty: 8.5 G | Refills: 3 | Status: SHIPPED | OUTPATIENT
Start: 2023-10-20

## 2023-10-27 DIAGNOSIS — R60.9 EDEMA: Primary | ICD-10-CM

## 2023-10-27 RX ORDER — FUROSEMIDE 40 MG/1
40 TABLET ORAL 2 TIMES DAILY
Qty: 90 TABLET | Refills: 3 | Status: SHIPPED | OUTPATIENT
Start: 2023-10-27 | End: 2024-04-26 | Stop reason: SDUPTHER

## 2024-01-17 ENCOUNTER — APPOINTMENT (OUTPATIENT)
Dept: CARDIOLOGY | Facility: CLINIC | Age: 89
End: 2024-01-17
Payer: MEDICARE

## 2024-01-17 ENCOUNTER — APPOINTMENT (OUTPATIENT)
Dept: PRIMARY CARE | Facility: CLINIC | Age: 89
End: 2024-01-17
Payer: MEDICARE

## 2024-02-05 DIAGNOSIS — I10 ESSENTIAL HYPERTENSION: ICD-10-CM

## 2024-02-05 RX ORDER — OLMESARTAN MEDOXOMIL 40 MG/1
40 TABLET ORAL DAILY
Qty: 90 TABLET | Refills: 1 | Status: SHIPPED | OUTPATIENT
Start: 2024-02-05 | End: 2024-04-22 | Stop reason: SDUPTHER

## 2024-02-13 DIAGNOSIS — E78.5 HYPERLIPIDEMIA, UNSPECIFIED HYPERLIPIDEMIA TYPE: ICD-10-CM

## 2024-02-13 RX ORDER — PRAVASTATIN SODIUM 40 MG/1
40 TABLET ORAL NIGHTLY
Qty: 90 TABLET | Refills: 1 | Status: SHIPPED | OUTPATIENT
Start: 2024-02-13

## 2024-02-20 ENCOUNTER — OFFICE VISIT (OUTPATIENT)
Dept: PRIMARY CARE | Facility: CLINIC | Age: 89
End: 2024-02-20
Payer: MEDICARE

## 2024-02-20 ENCOUNTER — OFFICE VISIT (OUTPATIENT)
Dept: CARDIOLOGY | Facility: CLINIC | Age: 89
End: 2024-02-20
Payer: MEDICARE

## 2024-02-20 VITALS — SYSTOLIC BLOOD PRESSURE: 151 MMHG | HEART RATE: 86 BPM | OXYGEN SATURATION: 94 % | DIASTOLIC BLOOD PRESSURE: 82 MMHG

## 2024-02-20 VITALS
DIASTOLIC BLOOD PRESSURE: 77 MMHG | SYSTOLIC BLOOD PRESSURE: 152 MMHG | HEART RATE: 90 BPM | BODY MASS INDEX: 45.64 KG/M2 | WEIGHT: 248 LBS | HEIGHT: 62 IN | OXYGEN SATURATION: 95 %

## 2024-02-20 DIAGNOSIS — E78.5 HYPERLIPIDEMIA, UNSPECIFIED HYPERLIPIDEMIA TYPE: ICD-10-CM

## 2024-02-20 DIAGNOSIS — R01.1 HEART MURMUR: Primary | ICD-10-CM

## 2024-02-20 DIAGNOSIS — R60.0 LOWER EXTREMITY EDEMA: ICD-10-CM

## 2024-02-20 DIAGNOSIS — M17.0 OSTEOARTHRITIS OF BOTH KNEES, UNSPECIFIED OSTEOARTHRITIS TYPE: ICD-10-CM

## 2024-02-20 DIAGNOSIS — G47.19 EXCESSIVE DAYTIME SLEEPINESS: ICD-10-CM

## 2024-02-20 DIAGNOSIS — R60.9 EDEMA, UNSPECIFIED TYPE: Primary | ICD-10-CM

## 2024-02-20 DIAGNOSIS — I10 ESSENTIAL HYPERTENSION: Primary | ICD-10-CM

## 2024-02-20 PROCEDURE — 90662 IIV NO PRSV INCREASED AG IM: CPT | Performed by: PHYSICIAN ASSISTANT

## 2024-02-20 PROCEDURE — G0008 ADMIN INFLUENZA VIRUS VAC: HCPCS | Performed by: PHYSICIAN ASSISTANT

## 2024-02-20 PROCEDURE — 3079F DIAST BP 80-89 MM HG: CPT | Performed by: PHYSICIAN ASSISTANT

## 2024-02-20 PROCEDURE — 3077F SYST BP >= 140 MM HG: CPT | Performed by: PHYSICIAN ASSISTANT

## 2024-02-20 PROCEDURE — 1036F TOBACCO NON-USER: CPT | Performed by: NURSE PRACTITIONER

## 2024-02-20 PROCEDURE — 1160F RVW MEDS BY RX/DR IN RCRD: CPT | Performed by: PHYSICIAN ASSISTANT

## 2024-02-20 PROCEDURE — 99213 OFFICE O/P EST LOW 20 MIN: CPT | Performed by: PHYSICIAN ASSISTANT

## 2024-02-20 PROCEDURE — 1036F TOBACCO NON-USER: CPT | Performed by: PHYSICIAN ASSISTANT

## 2024-02-20 PROCEDURE — 99214 OFFICE O/P EST MOD 30 MIN: CPT | Performed by: NURSE PRACTITIONER

## 2024-02-20 PROCEDURE — 1160F RVW MEDS BY RX/DR IN RCRD: CPT | Performed by: NURSE PRACTITIONER

## 2024-02-20 PROCEDURE — 1126F AMNT PAIN NOTED NONE PRSNT: CPT | Performed by: PHYSICIAN ASSISTANT

## 2024-02-20 PROCEDURE — 1159F MED LIST DOCD IN RCRD: CPT | Performed by: PHYSICIAN ASSISTANT

## 2024-02-20 PROCEDURE — 3077F SYST BP >= 140 MM HG: CPT | Performed by: NURSE PRACTITIONER

## 2024-02-20 PROCEDURE — 1159F MED LIST DOCD IN RCRD: CPT | Performed by: NURSE PRACTITIONER

## 2024-02-20 PROCEDURE — 3078F DIAST BP <80 MM HG: CPT | Performed by: NURSE PRACTITIONER

## 2024-02-20 PROCEDURE — 1126F AMNT PAIN NOTED NONE PRSNT: CPT | Performed by: NURSE PRACTITIONER

## 2024-02-20 RX ORDER — IBUPROFEN 200 MG
TABLET ORAL EVERY 6 HOURS PRN
COMMUNITY

## 2024-02-20 ASSESSMENT — ENCOUNTER SYMPTOMS
DEPRESSION: 0
GASTROINTESTINAL NEGATIVE: 1
CONSTITUTIONAL NEGATIVE: 1
LOSS OF SENSATION IN FEET: 1
OCCASIONAL FEELINGS OF UNSTEADINESS: 1
MUSCULOSKELETAL NEGATIVE: 1
DEPRESSION: 0
NEUROLOGICAL NEGATIVE: 1
RESPIRATORY NEGATIVE: 1
OCCASIONAL FEELINGS OF UNSTEADINESS: 0
LOSS OF SENSATION IN FEET: 0

## 2024-02-20 ASSESSMENT — PAIN SCALES - GENERAL: PAINLEVEL: 0-NO PAIN

## 2024-02-20 NOTE — PROGRESS NOTES
Subjective   Kelly Haji is a 89 y.o. female who presents for 6month f/u. Generally doing well. Currently c/o:    Difficulty sleeping 2/2 to chronic pains. Not interested in pain management. No snoring. Endorses excessive daytime sleepiness, naps often.      HTN/HLD: Stable. Compliant with Amlodipine 10 mg, Olmesartan 40 mg, Pravastatin 10 mg, and ASA 81 mg. She does not monitor BP at home.  Denies any headache, dizziness, chest pain, SOB/KING, palpitations. Endorses BLE edema for which she is taking furosemide 40mg 2x daily. She does wear compression boots, which help.     Hyperparathyroidism: Labs 3/27/23 showed hypercalcemia (11.5) and elevated alkaline phosphatase (175). Not currently following with endocrinology, last seen 2019.  At that time it was recommended that she increase hydration, take Lasix to help bring improve hypocalcemia, and continue monitoring.  Last DEXA 2019 showed normal bone density, no history of osteopenia or osteoporosis.    B/k knee OA: She has had numerous treatments in the past including muscle relaxers, opioid pain revealers, Gabapentin, joint injections, Diclofenac gel, Meloxicam, Tramadol and yet nothing has provided relief to the patient. She has also had a total right knee replacement, which per patient, still gives her pain. She saw Pain Management via a virtual visit once on 4/23/20, however she has not followed up since then. At home she ambulates with use of a walker. She is asking for something for pain stronger than tylenol, explained to her that our office doesn't prescribe opioid therapies.      Health maintenance:  Immunizations:  -Flu: obtain today, 2/20/24  -Pneumococcal: UTD  -Shingrix: recommended from pharmacy  -Tdap: recommended from pharmacy  DEXA due (last 5/29/2019)- ordered 3/27/23. Alk phos level and calcium have been elevated     Last MCR/ CPE: 3/27/23 (MCR ADV)    12 point ROS reviewed and negative other than as stated in HPI    /82   Pulse 86    SpO2 94%   Objective   Physical Exam  Vitals reviewed.   Constitutional:       General: She is not in acute distress.     Appearance: Normal appearance. She is not ill-appearing.   HENT:      Head: Normocephalic and atraumatic.   Eyes:      General: No scleral icterus.     Extraocular Movements: Extraocular movements intact.      Conjunctiva/sclera: Conjunctivae normal.      Pupils: Pupils are equal, round, and reactive to light.   Cardiovascular:      Rate and Rhythm: Normal rate and regular rhythm.      Heart sounds: Normal heart sounds. No murmur heard.     No friction rub. No gallop.   Pulmonary:      Effort: Pulmonary effort is normal. No respiratory distress.      Breath sounds: Normal breath sounds. No stridor. No wheezing, rhonchi or rales.   Musculoskeletal:      Cervical back: Normal range of motion.      Right lower leg: Edema (2+ pitting) present.      Left lower leg: Edema (2+ pitting) present.   Skin:     General: Skin is warm and dry.   Neurological:      Mental Status: She is alert and oriented to person, place, and time. Mental status is at baseline.      Cranial Nerves: No cranial nerve deficit.      Gait: Gait normal.   Psychiatric:         Mood and Affect: Mood normal.         Behavior: Behavior normal.         Assessment/Plan   Problem List Items Addressed This Visit       Degenerative arthritis of knee, bilateral     RICE therapy encouraged. Take tylenol arthritis PRN, avoid oral NSAIDs. Apply diclofenac gel. Follow up with pain management if persistent          Essential hypertension - Primary     Continue Amlodipine 10 mg and Olmesartan 40 mg, and ASA 81 mg. Recommend DASH diet.          Hyperlipidemia     Stable. Continue Pravastatin 10 mg. Recommend Mediterranean diet. Lipid panel ordered.         Lower extremity edema     Take furosemide as prescribed, do not skip doses. Elevate BLE when not ambulating. Wear compression stockings          Excessive daytime sleepiness     With insomnia.  Avoid napping throughout the day. Consider a sleep study. Weight loss is encouraged.              Follow up in 5-6 months with Dr. D'Amico for MCR wellness  or sooner as needed

## 2024-02-20 NOTE — PROGRESS NOTES
"Chief Complaint:   Follow-up    History Of Present Illness:    .Ms Haji returns in follow up.  Denies chest pain, sob, or palpitations. Has significant chronic edema and does not take her lasix as scheduled due to knee pain with going to the bathroom.  Has had several knee injections with no relief.  Long discussion regarding lasix use and use of compression for lymphedema.           Last Recorded Vitals:  Blood pressure 152/77, pulse 90, height 1.575 m (5' 2\"), weight 112 kg (248 lb), SpO2 95 %.     Past Medical History:  Past Medical History:   Diagnosis Date    Age-related osteoporosis without current pathological fracture 08/26/2013    Osteoporosis    Encounter for screening mammogram for malignant neoplasm of breast     Visit for screening mammogram    Morbid (severe) obesity due to excess calories (CMS/HCC)     Morbid obesity    Other conditions influencing health status     DEXA Body Composition Study    Personal history of other diseases of the circulatory system 08/12/2013    History of hypertension    Personal history of other diseases of the respiratory system     Personal history of asthma    Personal history of other endocrine, nutritional and metabolic disease     History of hyperlipidemia    Unspecified cataract     Cataract of left eye        Past Surgical History:  Past Surgical History:   Procedure Laterality Date    COLONOSCOPY  07/03/2013    Complete Colonoscopy    HEMORRHOID SURGERY  01/09/2015    Hemorrhoidectomy    HYSTERECTOMY  04/24/2013    Hysterectomy    OTHER SURGICAL HISTORY  05/17/2019    Cataract surgery    OTHER SURGICAL HISTORY  11/11/2019    Knee replacement       Social History:  Social History     Socioeconomic History    Marital status:      Spouse name: None    Number of children: None    Years of education: None    Highest education level: None   Occupational History    None   Tobacco Use    Smoking status: Never    Smokeless tobacco: Never   Substance and Sexual " Activity    Alcohol use: Never    Drug use: Never    Sexual activity: None   Other Topics Concern    None   Social History Narrative    None     Social Determinants of Health     Financial Resource Strain: Not on file   Food Insecurity: Not on file   Transportation Needs: Not on file   Physical Activity: Not on file   Stress: Not on file   Social Connections: Not on file   Intimate Partner Violence: Not on file   Housing Stability: Not on file       Family History:  Family History   Problem Relation Name Age of Onset    Breast cancer Mother      Diabetes Son           Allergies:  Penicillins and Sulfa (sulfonamide antibiotics)    Outpatient Medications:  Current Outpatient Medications   Medication Sig Dispense Refill    albuterol 90 mcg/actuation inhaler INHALE 1 TO 2 PUFFS BY MOUTH EVERY 4 TO 6 HOURS AS NEEDED 8.5 g 3    amLODIPine (Norvasc) 10 mg tablet Take 1 tablet (10 mg) by mouth once daily. 90 tablet 1    aspirin 81 mg EC tablet Take 1 tablet (81 mg) by mouth once daily.      furosemide (Lasix) 40 mg tablet Take 1 tablet (40 mg) by mouth 2 times a day. 90 tablet 3    ibuprofen 200 mg tablet Take by mouth every 6 hours if needed for mild pain (1 - 3).      olmesartan (BENIcar) 40 mg tablet Take 1 tablet (40 mg) by mouth once daily. Take olmesartan instead of Losartan 90 tablet 1    pravastatin (Pravachol) 40 mg tablet Take 1 tablet (40 mg) by mouth once daily at bedtime. 90 tablet 1     No current facility-administered medications for this visit.        Physical Exam:  Cardiovascular:      PMI at left midclavicular line. Normal rate. Regular rhythm. Normal S1. Normal S2.       Murmurs: There is no murmur.      No gallop.  No click. No rub.   Pulses:     Intact distal pulses.   Edema:     Thigh: bilateral 2+ edema of the thigh.     Pretibial: bilateral 2+ edema of the pretibial area.     Ankle: bilateral 2+ edema of the ankle.     Feet: bilateral 2+ edema of the feet.        ROS:  Review of Systems    Constitutional: Negative.   Cardiovascular:  Positive for leg swelling.   Respiratory: Negative.     Skin: Negative.    Musculoskeletal: Negative.    Gastrointestinal: Negative.    Genitourinary: Negative.    Neurological: Negative.           Last Labs:  CBC -  Lab Results   Component Value Date    WBC 10.5 03/27/2023    HGB 11.5 (L) 03/27/2023    HCT 35.5 (L) 03/27/2023    MCV 72 (L) 03/27/2023     03/27/2023       CMP -  Lab Results   Component Value Date    CALCIUM 11.5 (H) 03/27/2023    PHOS 2.6 08/22/2019    PROT 7.5 03/27/2023    ALBUMIN 4.3 03/27/2023    AST 11 03/27/2023    ALT 11 03/27/2023    ALKPHOS 175 (H) 03/27/2023    BILITOT 0.3 03/27/2023       LIPID PANEL -   Lab Results   Component Value Date    CHOL 214 (H) 11/07/2022    TRIG 116 11/07/2022    HDL 58.6 11/07/2022    CHHDL 3.7 11/07/2022    LDLF 132 (H) 11/07/2022    VLDL 23 11/07/2022       RENAL FUNCTION PANEL -   Lab Results   Component Value Date    GLUCOSE 99 03/27/2023     03/27/2023    K 4.4 03/27/2023     03/27/2023    CO2 26 03/27/2023    ANIONGAP 13 03/27/2023    BUN 10 03/27/2023    CREATININE 0.82 03/27/2023    CALCIUM 11.5 (H) 03/27/2023    PHOS 2.6 08/22/2019    ALBUMIN 4.3 03/27/2023        Lab Results   Component Value Date    BNP 76 09/03/2020    HGBA1C 6.0 (A) 08/11/2022         Assessment/Plan   Problem List Items Addressed This Visit    None      Impressions     1. Edema. Echo done 10/2019 showed EF 65-70%. Swelling moderate at today's office visit. Patient had negative PVRs when done October 2019. Is unable to wear compression stockings. Will continue lasix 40 mg twice daily and order home care with PT.  Has compression devices for lymphedema.     2. Hypertension. Adequately controlled on current olmesartan 40 mg daily.     3. Hyperlipidemia. FLP from 03/2021 showed chol 162, HDl 50, LDL 93 and trig 90. Continue pravastatin 40 mg daily. FLP done 11/2022, patient was off pravastatin, but has restarted.      4. DJD. Will see ortho and pain management. Had injections to the knees without improvement.     5. ? CAD. Had negative pharmacological nuclear stress test when done April 2010.     6. Hx of covid-19 vaccine #1 and #2.     7. Hx of hemorrhoid surgery 10/2020 and 05/2021.        Nori Roy, TAVARES-CNP

## 2024-02-29 PROBLEM — G47.19 EXCESSIVE DAYTIME SLEEPINESS: Status: ACTIVE | Noted: 2024-02-29

## 2024-02-29 NOTE — ASSESSMENT & PLAN NOTE
RICE therapy encouraged. Take tylenol arthritis PRN, avoid oral NSAIDs. Apply diclofenac gel. Follow up with pain management if persistent

## 2024-02-29 NOTE — ASSESSMENT & PLAN NOTE
With insomnia. Avoid napping throughout the day. Consider a sleep study. Weight loss is encouraged.

## 2024-02-29 NOTE — ASSESSMENT & PLAN NOTE
Take furosemide as prescribed, do not skip doses. Elevate BLE when not ambulating. Wear compression stockings

## 2024-03-04 DIAGNOSIS — I10 ESSENTIAL HYPERTENSION: ICD-10-CM

## 2024-03-04 RX ORDER — AMLODIPINE BESYLATE 10 MG/1
10 TABLET ORAL DAILY
Qty: 90 TABLET | Refills: 1 | Status: SHIPPED | OUTPATIENT
Start: 2024-03-04

## 2024-04-22 DIAGNOSIS — I10 ESSENTIAL HYPERTENSION: Primary | ICD-10-CM

## 2024-04-22 RX ORDER — OLMESARTAN MEDOXOMIL 40 MG/1
40 TABLET ORAL DAILY
Qty: 90 TABLET | Refills: 3 | Status: SHIPPED | OUTPATIENT
Start: 2024-04-22 | End: 2025-04-22

## 2024-04-26 DIAGNOSIS — R60.9 EDEMA: Primary | ICD-10-CM

## 2024-04-26 RX ORDER — FUROSEMIDE 40 MG/1
40 TABLET ORAL 2 TIMES DAILY
Qty: 180 TABLET | Refills: 3 | Status: SHIPPED | OUTPATIENT
Start: 2024-04-26 | End: 2025-04-26

## 2024-06-20 ENCOUNTER — APPOINTMENT (OUTPATIENT)
Dept: PRIMARY CARE | Facility: CLINIC | Age: 89
End: 2024-06-20
Payer: MEDICARE

## 2024-07-08 ENCOUNTER — LAB (OUTPATIENT)
Dept: LAB | Facility: LAB | Age: 89
End: 2024-07-08
Payer: MEDICARE

## 2024-07-08 ENCOUNTER — APPOINTMENT (OUTPATIENT)
Dept: PRIMARY CARE | Facility: CLINIC | Age: 89
End: 2024-07-08
Payer: MEDICARE

## 2024-07-08 VITALS — SYSTOLIC BLOOD PRESSURE: 129 MMHG | HEART RATE: 94 BPM | DIASTOLIC BLOOD PRESSURE: 72 MMHG | OXYGEN SATURATION: 96 %

## 2024-07-08 DIAGNOSIS — R60.0 LOWER EXTREMITY EDEMA: ICD-10-CM

## 2024-07-08 DIAGNOSIS — R30.0 DYSURIA: ICD-10-CM

## 2024-07-08 DIAGNOSIS — E78.5 HYPERLIPIDEMIA, UNSPECIFIED HYPERLIPIDEMIA TYPE: ICD-10-CM

## 2024-07-08 DIAGNOSIS — I10 ESSENTIAL HYPERTENSION: ICD-10-CM

## 2024-07-08 DIAGNOSIS — E21.0 HYPERPARATHYROIDISM, PRIMARY (MULTI): ICD-10-CM

## 2024-07-08 DIAGNOSIS — I10 ESSENTIAL HYPERTENSION: Primary | ICD-10-CM

## 2024-07-08 DIAGNOSIS — E55.9 VITAMIN D DEFICIENCY: ICD-10-CM

## 2024-07-08 DIAGNOSIS — Z00.00 WELLNESS EXAMINATION: ICD-10-CM

## 2024-07-08 DIAGNOSIS — Z00.00 MEDICARE ANNUAL WELLNESS VISIT, SUBSEQUENT: ICD-10-CM

## 2024-07-08 PROBLEM — M51.27 LUMBAGO-SCIATICA DUE TO DISPLACEMENT OF LUMBAR INTERVERTEBRAL DISC: Status: ACTIVE | Noted: 2017-10-23

## 2024-07-08 PROBLEM — M15.9 DEGENERATIVE JOINT DISEASE INVOLVING MULTIPLE JOINTS: Status: ACTIVE | Noted: 2017-10-23

## 2024-07-08 PROBLEM — M79.2 NEURITIS: Status: ACTIVE | Noted: 2017-10-23

## 2024-07-08 PROBLEM — G60.9 IDIOPATHIC NEUROPATHY: Status: ACTIVE | Noted: 2017-10-23

## 2024-07-08 LAB — PTH-INTACT SERPL-MCNC: 272.1 PG/ML (ref 18.5–88)

## 2024-07-08 PROCEDURE — 85027 COMPLETE CBC AUTOMATED: CPT

## 2024-07-08 PROCEDURE — 87086 URINE CULTURE/COLONY COUNT: CPT

## 2024-07-08 PROCEDURE — G0439 PPPS, SUBSEQ VISIT: HCPCS | Performed by: STUDENT IN AN ORGANIZED HEALTH CARE EDUCATION/TRAINING PROGRAM

## 2024-07-08 PROCEDURE — 82306 VITAMIN D 25 HYDROXY: CPT

## 2024-07-08 PROCEDURE — 1159F MED LIST DOCD IN RCRD: CPT | Performed by: STUDENT IN AN ORGANIZED HEALTH CARE EDUCATION/TRAINING PROGRAM

## 2024-07-08 PROCEDURE — 84443 ASSAY THYROID STIM HORMONE: CPT

## 2024-07-08 PROCEDURE — 1036F TOBACCO NON-USER: CPT | Performed by: STUDENT IN AN ORGANIZED HEALTH CARE EDUCATION/TRAINING PROGRAM

## 2024-07-08 PROCEDURE — 80061 LIPID PANEL: CPT

## 2024-07-08 PROCEDURE — 83970 ASSAY OF PARATHORMONE: CPT

## 2024-07-08 PROCEDURE — 81001 URINALYSIS AUTO W/SCOPE: CPT

## 2024-07-08 PROCEDURE — 99397 PER PM REEVAL EST PAT 65+ YR: CPT | Performed by: STUDENT IN AN ORGANIZED HEALTH CARE EDUCATION/TRAINING PROGRAM

## 2024-07-08 PROCEDURE — 99214 OFFICE O/P EST MOD 30 MIN: CPT | Performed by: STUDENT IN AN ORGANIZED HEALTH CARE EDUCATION/TRAINING PROGRAM

## 2024-07-08 PROCEDURE — 80053 COMPREHEN METABOLIC PANEL: CPT

## 2024-07-08 PROCEDURE — 1160F RVW MEDS BY RX/DR IN RCRD: CPT | Performed by: STUDENT IN AN ORGANIZED HEALTH CARE EDUCATION/TRAINING PROGRAM

## 2024-07-08 PROCEDURE — 1170F FXNL STATUS ASSESSED: CPT | Performed by: STUDENT IN AN ORGANIZED HEALTH CARE EDUCATION/TRAINING PROGRAM

## 2024-07-08 PROCEDURE — 3078F DIAST BP <80 MM HG: CPT | Performed by: STUDENT IN AN ORGANIZED HEALTH CARE EDUCATION/TRAINING PROGRAM

## 2024-07-08 PROCEDURE — 3074F SYST BP LT 130 MM HG: CPT | Performed by: STUDENT IN AN ORGANIZED HEALTH CARE EDUCATION/TRAINING PROGRAM

## 2024-07-08 PROCEDURE — 36415 COLL VENOUS BLD VENIPUNCTURE: CPT

## 2024-07-08 PROCEDURE — 1124F ACP DISCUSS-NO DSCNMKR DOCD: CPT | Performed by: STUDENT IN AN ORGANIZED HEALTH CARE EDUCATION/TRAINING PROGRAM

## 2024-07-08 RX ORDER — NITROFURANTOIN 25; 75 MG/1; MG/1
100 CAPSULE ORAL 2 TIMES DAILY
Qty: 10 CAPSULE | Refills: 0 | Status: SHIPPED | OUTPATIENT
Start: 2024-07-08 | End: 2024-07-13

## 2024-07-08 ASSESSMENT — PATIENT HEALTH QUESTIONNAIRE - PHQ9: 2. FEELING DOWN, DEPRESSED OR HOPELESS: NOT AT ALL

## 2024-07-08 ASSESSMENT — ACTIVITIES OF DAILY LIVING (ADL)
BATHING: NEEDS ASSISTANCE
MANAGING_FINANCES: NEEDS ASSISTANCE
GROCERY_SHOPPING: NEEDS ASSISTANCE
DRESSING: NEEDS ASSISTANCE
TAKING_MEDICATION: NEEDS ASSISTANCE
DOING_HOUSEWORK: NEEDS ASSISTANCE

## 2024-07-08 NOTE — PROGRESS NOTES
Subjective   Reason for Visit: Kelly Haji is an 89 y.o. female here for a Medicare Wellness visit.               HPI    Patient Care Team:  Christopher D'Amico, DO as PCP - General (Family Medicine)  Christopher D'Amico, DO as Referring Physician (Family Medicine)     Review of Systems    Objective   Vitals:  There were no vitals taken for this visit.      Physical Exam    Assessment/Plan   Problem List Items Addressed This Visit    None           HPI: 89-year-old female presenting to [facility] care, follow-up on chronic conditions, Medicare annual wellness exam/CPE.    HTN  Stable, tolerates current regimen well.    HLD  Stable, tolerates current regimen well.    Bilateral lower extremity edema  Chronic, difficulty putting on compression stockings.  Reports that she has been starting some exercises recently with foot glider.  Not monitoring sodium routinely.    Dysuria  Several weeks of tingling/discomfort with urination.    12 point ROS reviewed and negative other than as stated in HPI      General: Alert, oriented, pleasant, in no acute distress  HEENT:      Head: normocephalic, atraumatic;      eyes: EOMI, no scleral icterus;   Neck: soft, supple, non-tender, no masses appreciated  CV: Heart with regular rate and rhythm, normal S1/S2, no murmurs  Lungs: CTAB without wheezing, rhonchi or rales; good respiratory effort, no increased work of breathing  Abdomen: soft, non-tender, non-distended, no masses appreciated, significantly limited by body habitus  Extremities: +1-2 pitting edema bilaterally  Neuro: Cranial nerves grossly intact; alert and oriented, in wheelchair  Psych: Appropriate mood and affect    # HM  -CBC, CMP, Lipid panel, Vit D, TSH with reflex T4  -Vaccines:       Flu: Out of season      Shingrix: Recommended, advised to go to local pharmacy      Pneumococcal: UTD      Tdap: Recommended, advised to go to local pharmacy  -Osteoporosis screening: Deferred at this time  -No further screenings  recommended    #HTN  - BP at goal in office  - Continue amlodipine 10 mg daily, olmesartan 40 mg daily  -Repeat CMP    #HLD  - Continue pravastatin  - Repeat lipid panel     #Lower extremity edema  - Continue furosemide 40 mg twice daily  - Compression stockings  -Trial lymphedema clinic  -Encourage exercise, reduce sodium, weight loss  - Repeat CMP    #Dysuria  - Treat empirically with nitrofurantoin 100 mg twice daily x 5 days  - Urinalysis and culture    #Hyperparathyroidism  - Repeat lab    F/U 4-6 months    Chris D'Amico, DO

## 2024-07-09 DIAGNOSIS — E21.0 HYPERPARATHYROIDISM, PRIMARY (MULTI): Primary | ICD-10-CM

## 2024-07-09 DIAGNOSIS — E83.52 HYPERCALCEMIA: ICD-10-CM

## 2024-07-09 LAB
25(OH)D3 SERPL-MCNC: 68 NG/ML (ref 30–100)
ALBUMIN SERPL BCP-MCNC: 4.1 G/DL (ref 3.4–5)
ALP SERPL-CCNC: 154 U/L (ref 33–136)
ALT SERPL W P-5'-P-CCNC: 16 U/L (ref 7–45)
ANION GAP SERPL CALC-SCNC: 13 MMOL/L (ref 10–20)
APPEARANCE UR: CLEAR
AST SERPL W P-5'-P-CCNC: 15 U/L (ref 9–39)
BILIRUB SERPL-MCNC: 0.3 MG/DL (ref 0–1.2)
BILIRUB UR STRIP.AUTO-MCNC: NEGATIVE MG/DL
BUN SERPL-MCNC: 14 MG/DL (ref 6–23)
CALCIUM SERPL-MCNC: 11.5 MG/DL (ref 8.6–10.6)
CHLORIDE SERPL-SCNC: 106 MMOL/L (ref 98–107)
CHOLEST SERPL-MCNC: 169 MG/DL (ref 0–199)
CHOLESTEROL/HDL RATIO: 2.7
CO2 SERPL-SCNC: 24 MMOL/L (ref 21–32)
COLOR UR: ABNORMAL
CREAT SERPL-MCNC: 0.73 MG/DL (ref 0.5–1.05)
EGFRCR SERPLBLD CKD-EPI 2021: 80 ML/MIN/1.73M*2
ERYTHROCYTE [DISTWIDTH] IN BLOOD BY AUTOMATED COUNT: 17.3 % (ref 11.5–14.5)
GLUCOSE SERPL-MCNC: 101 MG/DL (ref 74–99)
GLUCOSE UR STRIP.AUTO-MCNC: NORMAL MG/DL
HCT VFR BLD AUTO: 34.1 % (ref 36–46)
HDLC SERPL-MCNC: 61.6 MG/DL
HGB BLD-MCNC: 11.1 G/DL (ref 12–16)
KETONES UR STRIP.AUTO-MCNC: NEGATIVE MG/DL
LDLC SERPL CALC-MCNC: 89 MG/DL
LEUKOCYTE ESTERASE UR QL STRIP.AUTO: ABNORMAL
MCH RBC QN AUTO: 23.4 PG (ref 26–34)
MCHC RBC AUTO-ENTMCNC: 32.6 G/DL (ref 32–36)
MCV RBC AUTO: 72 FL (ref 80–100)
NITRITE UR QL STRIP.AUTO: NEGATIVE
NON HDL CHOLESTEROL: 107 MG/DL (ref 0–149)
NRBC BLD-RTO: 0 /100 WBCS (ref 0–0)
PH UR STRIP.AUTO: 6 [PH]
PLATELET # BLD AUTO: 414 X10*3/UL (ref 150–450)
POTASSIUM SERPL-SCNC: 4.4 MMOL/L (ref 3.5–5.3)
PROT SERPL-MCNC: 7.2 G/DL (ref 6.4–8.2)
PROT UR STRIP.AUTO-MCNC: NEGATIVE MG/DL
RBC # BLD AUTO: 4.74 X10*6/UL (ref 4–5.2)
RBC # UR STRIP.AUTO: NEGATIVE /UL
RBC #/AREA URNS AUTO: NORMAL /HPF
SODIUM SERPL-SCNC: 139 MMOL/L (ref 136–145)
SP GR UR STRIP.AUTO: 1.01
TRIGL SERPL-MCNC: 91 MG/DL (ref 0–149)
TSH SERPL-ACNC: 1.42 MIU/L (ref 0.44–3.98)
UROBILINOGEN UR STRIP.AUTO-MCNC: NORMAL MG/DL
VLDL: 18 MG/DL (ref 0–40)
WBC # BLD AUTO: 11 X10*3/UL (ref 4.4–11.3)
WBC #/AREA URNS AUTO: NORMAL /HPF

## 2024-07-09 NOTE — RESULT ENCOUNTER NOTE
Urinalysis does show likely urinary tract infection, pending culture.  Continue current regimen, will switch if culture shows organism resistant to that medication.    Calcium elevated with moderately elevated parathyroid hormone, which has been elevated over the past several years.  I would recommend seeing an endocrinologist for evaluation and treatment.  Referral placed.    Remaining labs mostly unremarkable.

## 2024-07-10 ENCOUNTER — TELEPHONE (OUTPATIENT)
Dept: PRIMARY CARE | Facility: CLINIC | Age: 89
End: 2024-07-10
Payer: MEDICARE

## 2024-07-10 LAB — BACTERIA UR CULT: NO GROWTH

## 2024-07-10 NOTE — TELEPHONE ENCOUNTER
Result Communication    Resulted Orders   CBC   Result Value Ref Range    WBC 11.0 4.4 - 11.3 x10*3/uL    nRBC 0.0 0.0 - 0.0 /100 WBCs    RBC 4.74 4.00 - 5.20 x10*6/uL    Hemoglobin 11.1 (L) 12.0 - 16.0 g/dL    Hematocrit 34.1 (L) 36.0 - 46.0 %    MCV 72 (L) 80 - 100 fL    MCH 23.4 (L) 26.0 - 34.0 pg    MCHC 32.6 32.0 - 36.0 g/dL    RDW 17.3 (H) 11.5 - 14.5 %    Platelets 414 150 - 450 x10*3/uL   Lipid Panel   Result Value Ref Range    Cholesterol 169 0 - 199 mg/dL      Comment:            Age      Desirable   Borderline High   High     0-19 Y     0 - 169       170 - 199     >/= 200    20-24 Y     0 - 189       190 - 224     >/= 225         >24 Y     0 - 199       200 - 239     >/= 240   **All ranges are based on fasting samples. Specific   therapeutic targets will vary based on patient-specific   cardiac risk.    Pediatric guidelines reference:Pediatrics 2011, 128(S5).Adult guidelines reference: NCEP ATPIII Guidelines,ROBIN 2001, 258:2486-97    Venipuncture immediately after or during the administration of Metamizole may lead to falsely low results. Testing should be performed immediately prior to Metamizole dosing.    HDL-Cholesterol 61.6 mg/dL      Comment:        Age       Very Low   Low     Normal    High    0-19 Y    < 35      < 40     40-45     ----  20-24 Y    ----     < 40      >45      ----        >24 Y      ----     < 40     40-60      >60      Cholesterol/HDL Ratio 2.7       Comment:        Ref Values  Desirable  < 3.4  High Risk  > 5.0    LDL Calculated 89 <=99 mg/dL      Comment:                                  Near   Borderline      AGE      Desirable  Optimal    High     High     Very High     0-19 Y     0 - 109     ---    110-129   >/= 130     ----    20-24 Y     0 - 119     ---    120-159   >/= 160     ----      >24 Y     0 -  99   100-129  130-159   160-189     >/=190      VLDL 18 0 - 40 mg/dL    Triglycerides 91 0 - 149 mg/dL      Comment:         Age         Desirable   Borderline High   High      Very High   0 D-90 D    19 - 174         ----         ----        ----  91 D- 9 Y     0 -  74        75 -  99     >/= 100      ----    10-19 Y     0 -  89        90 - 129     >/= 130      ----    20-24 Y     0 - 114       115 - 149     >/= 150      ----         >24 Y     0 - 149       150 - 199    200- 499    >/= 500    Venipuncture immediately after or during the administration of Metamizole may lead to falsely low results. Testing should be performed immediately prior to Metamizole dosing.    Non HDL Cholesterol 107 0 - 149 mg/dL      Comment:            Age       Desirable   Borderline High   High     Very High     0-19 Y     0 - 119       120 - 144     >/= 145    >/= 160    20-24 Y     0 - 149       150 - 189     >/= 190      ----         >24 Y    30 mg/dL above LDL Cholesterol goal     Comprehensive Metabolic Panel   Result Value Ref Range    Glucose 101 (H) 74 - 99 mg/dL    Sodium 139 136 - 145 mmol/L    Potassium 4.4 3.5 - 5.3 mmol/L    Chloride 106 98 - 107 mmol/L    Bicarbonate 24 21 - 32 mmol/L    Anion Gap 13 10 - 20 mmol/L    Urea Nitrogen 14 6 - 23 mg/dL    Creatinine 0.73 0.50 - 1.05 mg/dL    eGFR 80 >60 mL/min/1.73m*2      Comment:      Calculations of estimated GFR are performed using the 2021 CKD-EPI Study Refit equation without the race variable for the IDMS-Traceable creatinine methods.  https://jasn.asnjournals.org/content/early/2021/09/22/ASN.5894896278    Calcium 11.5 (H) 8.6 - 10.6 mg/dL    Albumin 4.1 3.4 - 5.0 g/dL    Alkaline Phosphatase 154 (H) 33 - 136 U/L    Total Protein 7.2 6.4 - 8.2 g/dL    AST 15 9 - 39 U/L    Bilirubin, Total 0.3 0.0 - 1.2 mg/dL    ALT 16 7 - 45 U/L      Comment:      Patients treated with Sulfasalazine may generate falsely decreased results for ALT.   TSH with reflex to Free T4 if abnormal   Result Value Ref Range    Thyroid Stimulating Hormone 1.42 0.44 - 3.98 mIU/L    Narrative    TSH testing is performed using different testing methodology at [facility]  [place] than at other system hospitals. Direct result comparisons should only be made within the same method.     Vitamin D 25-Hydroxy,Total (for eval of Vitamin D levels)   Result Value Ref Range    Vitamin D, 25-Hydroxy, Total 68 30 - 100 ng/mL    Narrative    Deficiency:         < 20   ng/ml  Insufficiency:      20-29  ng/ml  Sufficiency:         ng/ml  This assay accurately quantifies the sum of Vitamin D3, 25-Hydroxy and Vitamin D2,25-Hydroxy.   Parathyroid Hormone, Intact   Result Value Ref Range    Parathyroid Hormone, Intact 272.1 (H) 18.5 - 88.0 pg/mL   Urinalysis with Reflex Microscopic   Result Value Ref Range    Color, Urine Light-Yellow Light-Yellow, Yellow, Dark-Yellow    Appearance, Urine Clear Clear    Specific Gravity, Urine 1.012 1.005 - 1.035    pH, Urine 6.0 5.0, 5.5, 6.0, 6.5, 7.0, 7.5, 8.0    Protein, Urine NEGATIVE NEGATIVE, 10 (TRACE), 20 (TRACE) mg/dL    Glucose, Urine Normal Normal mg/dL    Blood, Urine NEGATIVE NEGATIVE    Ketones, Urine NEGATIVE NEGATIVE mg/dL    Bilirubin, Urine NEGATIVE NEGATIVE    Urobilinogen, Urine Normal Normal mg/dL    Nitrite, Urine NEGATIVE NEGATIVE    Leukocyte Esterase, Urine 500 Juan Antonio/µL (A) NEGATIVE   Microscopic Only, Urine   Result Value Ref Range    WBC, Urine NONE 1-5, NONE /HPF    RBC, Urine 1-2 NONE, 1-2, 3-5 /HPF       3:17 PM      Results were successfully communicated with the  daughter  and they acknowledged their understanding.  Result Communication    Resulted Orders   CBC   Result Value Ref Range    WBC 11.0 4.4 - 11.3 x10*3/uL    nRBC 0.0 0.0 - 0.0 /100 WBCs    RBC 4.74 4.00 - 5.20 x10*6/uL    Hemoglobin 11.1 (L) 12.0 - 16.0 g/dL    Hematocrit 34.1 (L) 36.0 - 46.0 %    MCV 72 (L) 80 - 100 fL    MCH 23.4 (L) 26.0 - 34.0 pg    MCHC 32.6 32.0 - 36.0 g/dL    RDW 17.3 (H) 11.5 - 14.5 %    Platelets 414 150 - 450 x10*3/uL   Lipid Panel   Result Value Ref Range    Cholesterol 169 0 - 199 mg/dL      Comment:            Age      Desirable   Borderline  High   High     0-19 Y     0 - 169       170 - 199     >/= 200    20-24 Y     0 - 189       190 - 224     >/= 225         >24 Y     0 - 199       200 - 239     >/= 240   **All ranges are based on fasting samples. Specific   therapeutic targets will vary based on patient-specific   cardiac risk.    Pediatric guidelines reference:Pediatrics 2011, 128(S5).Adult guidelines reference: NCEP ATPIII Guidelines,ROBIN 2001, 258:2266-97    Venipuncture immediately after or during the administration of Metamizole may lead to falsely low results. Testing should be performed immediately prior to Metamizole dosing.    HDL-Cholesterol 61.6 mg/dL      Comment:        Age       Very Low   Low     Normal    High    0-19 Y    < 35      < 40     40-45     ----  20-24 Y    ----     < 40      >45      ----        >24 Y      ----     < 40     40-60      >60      Cholesterol/HDL Ratio 2.7       Comment:        Ref Values  Desirable  < 3.4  High Risk  > 5.0    LDL Calculated 89 <=99 mg/dL      Comment:                                  Near   Borderline      AGE      Desirable  Optimal    High     High     Very High     0-19 Y     0 - 109     ---    110-129   >/= 130     ----    20-24 Y     0 - 119     ---    120-159   >/= 160     ----      >24 Y     0 -  99   100-129  130-159   160-189     >/=190      VLDL 18 0 - 40 mg/dL    Triglycerides 91 0 - 149 mg/dL      Comment:         Age         Desirable   Borderline High   High     Very High   0 D-90 D    19 - 174         ----         ----        ----  91 D- 9 Y     0 -  74        75 -  99     >/= 100      ----    10-19 Y     0 -  89        90 - 129     >/= 130      ----    20-24 Y     0 - 114       115 - 149     >/= 150      ----         >24 Y     0 - 149       150 - 199    200- 499    >/= 500    Venipuncture immediately after or during the administration of Metamizole may lead to falsely low results. Testing should be performed immediately prior to Metamizole dosing.    Non HDL Cholesterol 107 0  - 149 mg/dL      Comment:            Age       Desirable   Borderline High   High     Very High     0-19 Y     0 - 119       120 - 144     >/= 145    >/= 160    20-24 Y     0 - 149       150 - 189     >/= 190      ----         >24 Y    30 mg/dL above LDL Cholesterol goal     Comprehensive Metabolic Panel   Result Value Ref Range    Glucose 101 (H) 74 - 99 mg/dL    Sodium 139 136 - 145 mmol/L    Potassium 4.4 3.5 - 5.3 mmol/L    Chloride 106 98 - 107 mmol/L    Bicarbonate 24 21 - 32 mmol/L    Anion Gap 13 10 - 20 mmol/L    Urea Nitrogen 14 6 - 23 mg/dL    Creatinine 0.73 0.50 - 1.05 mg/dL    eGFR 80 >60 mL/min/1.73m*2      Comment:      Calculations of estimated GFR are performed using the 2021 CKD-EPI Study Refit equation without the race variable for the IDMS-Traceable creatinine methods.  https://jasn.asnjournals.org/content/early/2021/09/22/ASN.2934730261    Calcium 11.5 (H) 8.6 - 10.6 mg/dL    Albumin 4.1 3.4 - 5.0 g/dL    Alkaline Phosphatase 154 (H) 33 - 136 U/L    Total Protein 7.2 6.4 - 8.2 g/dL    AST 15 9 - 39 U/L    Bilirubin, Total 0.3 0.0 - 1.2 mg/dL    ALT 16 7 - 45 U/L      Comment:      Patients treated with Sulfasalazine may generate falsely decreased results for ALT.   TSH with reflex to Free T4 if abnormal   Result Value Ref Range    Thyroid Stimulating Hormone 1.42 0.44 - 3.98 mIU/L    Narrative    TSH testing is performed using different testing methodology at [facility] than at other [facility]. Direct result comparisons should only be made within the same method.     Vitamin D 25-Hydroxy,Total (for eval of Vitamin D levels)   Result Value Ref Range    Vitamin D, 25-Hydroxy, Total 68 30 - 100 ng/mL    Narrative    Deficiency:         < 20   ng/ml  Insufficiency:      20-29  ng/ml  Sufficiency:         ng/ml  This assay accurately quantifies the sum of Vitamin D3, 25-Hydroxy and Vitamin D2,25-Hydroxy.   Parathyroid Hormone, Intact   Result Value Ref Range    Parathyroid  Hormone, Intact 272.1 (H) 18.5 - 88.0 pg/mL   Urinalysis with Reflex Microscopic   Result Value Ref Range    Color, Urine Light-Yellow Light-Yellow, Yellow, Dark-Yellow    Appearance, Urine Clear Clear    Specific Gravity, Urine 1.012 1.005 - 1.035    pH, Urine 6.0 5.0, 5.5, 6.0, 6.5, 7.0, 7.5, 8.0    Protein, Urine NEGATIVE NEGATIVE, 10 (TRACE), 20 (TRACE) mg/dL    Glucose, Urine Normal Normal mg/dL    Blood, Urine NEGATIVE NEGATIVE    Ketones, Urine NEGATIVE NEGATIVE mg/dL    Bilirubin, Urine NEGATIVE NEGATIVE    Urobilinogen, Urine Normal Normal mg/dL    Nitrite, Urine NEGATIVE NEGATIVE    Leukocyte Esterase, Urine 500 Juan Antonio/µL (A) NEGATIVE   Microscopic Only, Urine   Result Value Ref Range    WBC, Urine NONE 1-5, NONE /HPF    RBC, Urine 1-2 NONE, 1-2, 3-5 /HPF       3:17 PM      Results were successfully communicated with the  daughter  and they acknowledged their understanding.

## 2024-07-10 NOTE — TELEPHONE ENCOUNTER
----- Message from Christopher D'Amico sent at 7/9/2024  1:28 PM EDT -----  Urinalysis does show likely urinary tract infection, pending culture.  Continue current regimen, will switch if culture shows organism resistant to that medication.    Calcium elevated with moderately elevated parathyroid hormone, which has been elevated over the past several years.  I would recommend seeing an endocrinologist for evaluation and treatment.  Referral placed.    Remaining labs mostly unremarkable.

## 2024-09-09 DIAGNOSIS — E78.5 HYPERLIPIDEMIA, UNSPECIFIED HYPERLIPIDEMIA TYPE: ICD-10-CM

## 2024-09-09 DIAGNOSIS — I10 ESSENTIAL HYPERTENSION: ICD-10-CM

## 2024-09-09 RX ORDER — AMLODIPINE BESYLATE 10 MG/1
10 TABLET ORAL DAILY
Qty: 90 TABLET | Refills: 1 | Status: SHIPPED | OUTPATIENT
Start: 2024-09-09

## 2024-09-09 RX ORDER — PRAVASTATIN SODIUM 40 MG/1
40 TABLET ORAL NIGHTLY
Qty: 90 TABLET | Refills: 1 | Status: SHIPPED | OUTPATIENT
Start: 2024-09-09

## 2024-09-26 ENCOUNTER — TELEPHONE (OUTPATIENT)
Dept: PRIMARY CARE | Facility: CLINIC | Age: 89
End: 2024-09-26
Payer: MEDICARE

## 2024-09-26 NOTE — TELEPHONE ENCOUNTER
"Patients daughter called requesting a new PT referral be put in for \"Home PT\" due to her mom not being able to navigate so well and she said they could not schedule her until January for the regular PT  "

## 2024-10-09 ENCOUNTER — TELEPHONE (OUTPATIENT)
Dept: PRIMARY CARE | Facility: CLINIC | Age: 89
End: 2024-10-09
Payer: MEDICARE

## 2024-10-09 DIAGNOSIS — R26.81 GAIT INSTABILITY: ICD-10-CM

## 2024-10-09 DIAGNOSIS — R60.0 LOWER EXTREMITY EDEMA: ICD-10-CM

## 2024-10-09 DIAGNOSIS — R60.0 LOWER EXTREMITY EDEMA: Primary | ICD-10-CM

## 2024-10-09 RX ORDER — CALCIUM CARBONATE 160(400)MG
TABLET,CHEWABLE ORAL
Qty: 1 EACH | Refills: 0 | Status: SHIPPED | OUTPATIENT
Start: 2024-10-09

## 2024-10-09 RX ORDER — CALCIUM CARBONATE 160(400)MG
TABLET,CHEWABLE ORAL
Qty: 1 EACH | Refills: 0 | Status: SHIPPED | OUTPATIENT
Start: 2024-10-09 | End: 2024-10-09 | Stop reason: SDUPTHER

## 2024-10-09 NOTE — TELEPHONE ENCOUNTER
"(1) Can you place another order for PT for lymphedema, they scheduled and then cancelled and now a new referral is needed (2) can you do a script for a \"rolator\"  "

## 2024-11-04 ENCOUNTER — APPOINTMENT (OUTPATIENT)
Dept: OPHTHALMOLOGY | Facility: CLINIC | Age: 89
End: 2024-11-04
Payer: MEDICARE

## 2024-11-04 ENCOUNTER — TELEPHONE (OUTPATIENT)
Dept: PRIMARY CARE | Facility: CLINIC | Age: 89
End: 2024-11-04

## 2024-11-04 DIAGNOSIS — H16.223 KERATOCONJUNCTIVITIS SICCA OF BOTH EYES NOT SPECIFIED AS SJOGREN'S: Primary | ICD-10-CM

## 2024-11-04 DIAGNOSIS — H52.223 REGULAR ASTIGMATISM OF BOTH EYES WITH PRESBYOPIA: ICD-10-CM

## 2024-11-04 DIAGNOSIS — Z96.1 PSEUDOPHAKIA OF BOTH EYES: ICD-10-CM

## 2024-11-04 DIAGNOSIS — H52.4 REGULAR ASTIGMATISM OF BOTH EYES WITH PRESBYOPIA: ICD-10-CM

## 2024-11-04 PROBLEM — H04.123 DRY EYES: Status: RESOLVED | Noted: 2023-03-24 | Resolved: 2024-11-04

## 2024-11-04 PROBLEM — H53.8 BLURRED VISION, BILATERAL: Status: RESOLVED | Noted: 2023-03-24 | Resolved: 2024-11-04

## 2024-11-04 PROBLEM — H25.813 MIXED TYPE AGE-RELATED CATARACT, BOTH EYES: Status: RESOLVED | Noted: 2023-03-24 | Resolved: 2024-11-04

## 2024-11-04 PROBLEM — Z98.41 CATARACT EXTRACTION STATUS OF RIGHT EYE: Status: RESOLVED | Noted: 2023-03-24 | Resolved: 2024-11-04

## 2024-11-04 PROBLEM — Z97.3 WEARS GLASSES: Status: RESOLVED | Noted: 2023-03-24 | Resolved: 2024-11-04

## 2024-11-04 PROBLEM — H52.7 REFRACTIVE ERROR: Status: RESOLVED | Noted: 2023-03-24 | Resolved: 2024-11-04

## 2024-11-04 PROBLEM — Z98.42 CATARACT EXTRACTION STATUS OF LEFT EYE: Status: RESOLVED | Noted: 2023-03-24 | Resolved: 2024-11-04

## 2024-11-04 PROCEDURE — 92134 CPTRZ OPH DX IMG PST SGM RTA: CPT | Performed by: STUDENT IN AN ORGANIZED HEALTH CARE EDUCATION/TRAINING PROGRAM

## 2024-11-04 PROCEDURE — 92004 COMPRE OPH EXAM NEW PT 1/>: CPT | Performed by: STUDENT IN AN ORGANIZED HEALTH CARE EDUCATION/TRAINING PROGRAM

## 2024-11-04 RX ORDER — FLUOROMETHOLONE 1 MG/ML
1 SUSPENSION/ DROPS OPHTHALMIC 2 TIMES DAILY
Qty: 5 ML | Refills: 0 | Status: SHIPPED | OUTPATIENT
Start: 2024-11-04 | End: 2024-12-02

## 2024-11-04 RX ORDER — CYCLOSPORINE 0.5 MG/ML
1 EMULSION OPHTHALMIC 2 TIMES DAILY
Qty: 60 EACH | Refills: 6 | Status: SHIPPED | OUTPATIENT
Start: 2024-11-04 | End: 2024-12-02

## 2024-11-04 ASSESSMENT — REFRACTION_MANIFEST
OS_AXIS: 140
OS_AXIS: 121
OS_CYLINDER: +0.50
OD_SPHERE: +1.00
OD_ADD: +2.50
OS_SPHERE: +1.00
OD_CYLINDER: +0.50
OD_CYLINDER: +1.00
OS_ADD: +2.50
OS_CYLINDER: +0.50
OD_AXIS: 005
OD_AXIS: 175
OS_SPHERE: +0.25
METHOD_AUTOREFRACTION: 1
OD_SPHERE: +1.25

## 2024-11-04 ASSESSMENT — ENCOUNTER SYMPTOMS
CARDIOVASCULAR NEGATIVE: 0
PSYCHIATRIC NEGATIVE: 0
GASTROINTESTINAL NEGATIVE: 0
RESPIRATORY NEGATIVE: 0
ENDOCRINE NEGATIVE: 0
HEMATOLOGIC/LYMPHATIC NEGATIVE: 0
ALLERGIC/IMMUNOLOGIC NEGATIVE: 0
CONSTITUTIONAL NEGATIVE: 0
MUSCULOSKELETAL NEGATIVE: 0
NEUROLOGICAL NEGATIVE: 0
EYES NEGATIVE: 1

## 2024-11-04 ASSESSMENT — REFRACTION
OD_AXIS: 020
OD_CYLINDER: +1.50
OS_CYLINDER: +0.50
OS_SPHERE: +0.25
OD_ADD: +2.50
OD_SPHERE: +0.50
OS_ADD: +2.50
OS_AXIS: 140

## 2024-11-04 ASSESSMENT — CONF VISUAL FIELD
OS_NORMAL: 1
OS_SUPERIOR_NASAL_RESTRICTION: 0
OD_SUPERIOR_NASAL_RESTRICTION: 0
OD_INFERIOR_NASAL_RESTRICTION: 0
OS_INFERIOR_NASAL_RESTRICTION: 0
OS_SUPERIOR_TEMPORAL_RESTRICTION: 0
OS_INFERIOR_TEMPORAL_RESTRICTION: 0
OD_NORMAL: 1
OD_SUPERIOR_TEMPORAL_RESTRICTION: 0
OD_INFERIOR_TEMPORAL_RESTRICTION: 0
METHOD: COUNTING FINGERS

## 2024-11-04 ASSESSMENT — VISUAL ACUITY
OS_CC: 20/25
OD_CC: 20/25
CORRECTION_TYPE: GLASSES
OS_CC+: -2
METHOD: SNELLEN - LINEAR

## 2024-11-04 ASSESSMENT — REFRACTION_WEARINGRX
OD_ADD: +0.75
OS_CYLINDER: +0.50
OS_SPHERE: +0.25
OD_SPHERE: +1.00
OS_AXIS: 007
OS_ADD: +0.75
OD_CYLINDER: +0.75
OD_AXIS: 173

## 2024-11-04 ASSESSMENT — TONOMETRY
IOP_METHOD: GOLDMANN APPLANATION
OS_IOP_MMHG: 18
OD_IOP_MMHG: 17

## 2024-11-04 ASSESSMENT — EXTERNAL EXAM - RIGHT EYE: OD_EXAM: NORMAL

## 2024-11-04 ASSESSMENT — CUP TO DISC RATIO
OD_RATIO: .3
OS_RATIO: 0.2

## 2024-11-04 ASSESSMENT — EXTERNAL EXAM - LEFT EYE: OS_EXAM: NORMAL

## 2024-11-04 NOTE — PROGRESS NOTES
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 Failure with OTC AT's as a monotherapy  Testing to evaluate the presence of dry eye disease (DED) was performed today and provided the following results:  Tear break up time (TBUT), a measure of tear stability (0-5 = severe, 6-10 = moderate, 11-15 = mild)  OD: 4  seconds  OS: 4  seconds  Corneal punctate epithelial erosions (PEE) staining with sodium fluorescein score (5 zones, each PEE level 0-3, maximum score = 15)  OD: 4  OS: 5  Meibomian gland disease (Efron scale 0-4, 0: no abnormality, 4: thick creamy yellow expression at all gland orifices, expression continuous, conjunctival redness):  OD: 2   OS: 2    TXT Plan: Restasis BID, FML BID for 28 days, Refresh or Systane BID, and warm compresses    Restasis RX'd as medically necessary for failure with AT's and reduced TBUT as above    Will start with treating her ocular surface and check for signs of improvement. Dispensed spec RX today but patient advised on potential changes with improvement in ocular surface. Posterior segment exam wnl. OCT MAC wnl.     RTC 3 months for f/u dry eye check with inflammadry and repeat MRX

## 2024-11-05 DIAGNOSIS — R26.81 GAIT INSTABILITY: Primary | ICD-10-CM

## 2024-11-13 ENCOUNTER — APPOINTMENT (OUTPATIENT)
Dept: PHYSICAL THERAPY | Facility: HOSPITAL | Age: 89
End: 2024-11-13
Payer: MEDICARE

## 2025-01-06 ENCOUNTER — APPOINTMENT (OUTPATIENT)
Dept: PRIMARY CARE | Facility: CLINIC | Age: 89
End: 2025-01-06
Payer: MEDICARE

## 2025-01-30 ENCOUNTER — APPOINTMENT (OUTPATIENT)
Dept: ENDOCRINOLOGY | Facility: CLINIC | Age: 89
End: 2025-01-30
Payer: MEDICARE

## 2025-02-18 ENCOUNTER — APPOINTMENT (OUTPATIENT)
Dept: OPHTHALMOLOGY | Facility: CLINIC | Age: 89
End: 2025-02-18
Payer: MEDICARE

## 2025-03-17 DIAGNOSIS — J45.909 ASTHMA, UNSPECIFIED ASTHMA SEVERITY, UNSPECIFIED WHETHER COMPLICATED, UNSPECIFIED WHETHER PERSISTENT (HHS-HCC): ICD-10-CM

## 2025-03-17 RX ORDER — ALBUTEROL SULFATE 90 UG/1
2 INHALANT RESPIRATORY (INHALATION) EVERY 4 HOURS PRN
Qty: 8.5 G | Refills: 1 | Status: SHIPPED | OUTPATIENT
Start: 2025-03-17

## 2025-03-31 DIAGNOSIS — E78.5 HYPERLIPIDEMIA, UNSPECIFIED HYPERLIPIDEMIA TYPE: ICD-10-CM

## 2025-03-31 DIAGNOSIS — I10 ESSENTIAL HYPERTENSION: ICD-10-CM

## 2025-03-31 RX ORDER — PRAVASTATIN SODIUM 40 MG/1
40 TABLET ORAL NIGHTLY
Qty: 90 TABLET | Refills: 0 | Status: SHIPPED | OUTPATIENT
Start: 2025-03-31

## 2025-03-31 RX ORDER — AMLODIPINE BESYLATE 10 MG/1
10 TABLET ORAL DAILY
Qty: 90 TABLET | Refills: 0 | Status: SHIPPED | OUTPATIENT
Start: 2025-03-31

## 2025-06-16 DIAGNOSIS — I10 ESSENTIAL HYPERTENSION: Primary | ICD-10-CM

## 2025-06-16 RX ORDER — OLMESARTAN MEDOXOMIL 40 MG/1
40 TABLET ORAL DAILY
Qty: 90 TABLET | Refills: 3 | Status: SHIPPED | OUTPATIENT
Start: 2025-06-16 | End: 2026-06-16

## 2025-06-27 ENCOUNTER — TELEPHONE (OUTPATIENT)
Dept: CARDIOLOGY | Facility: CLINIC | Age: 89
End: 2025-06-27
Payer: MEDICARE

## 2025-06-27 DIAGNOSIS — R60.9 EDEMA: ICD-10-CM

## 2025-06-27 RX ORDER — FUROSEMIDE 40 MG/1
40 TABLET ORAL 2 TIMES DAILY
Qty: 60 TABLET | Refills: 0 | Status: SHIPPED | OUTPATIENT
Start: 2025-06-27 | End: 2026-06-27

## 2025-06-30 DIAGNOSIS — I10 ESSENTIAL HYPERTENSION: ICD-10-CM

## 2025-06-30 DIAGNOSIS — E78.5 HYPERLIPIDEMIA, UNSPECIFIED HYPERLIPIDEMIA TYPE: ICD-10-CM

## 2025-07-06 ASSESSMENT — ENCOUNTER SYMPTOMS
MUSCULOSKELETAL NEGATIVE: 1
NEUROLOGICAL NEGATIVE: 1
RESPIRATORY NEGATIVE: 1
CONSTITUTIONAL NEGATIVE: 1
GASTROINTESTINAL NEGATIVE: 1

## 2025-07-06 NOTE — PROGRESS NOTES
Chief Complaint:   No chief complaint on file.    History Of Present Illness:    .Ms Haji returns in follow up.  Denies chest pain, sob, or palpitations. Has significant chronic edema and does not take her lasix as scheduled due to knee pain with going to the bathroom.  Has had several knee injections with no relief.  Long discussion regarding lasix use and use of compression for lymphedema.         Last Recorded Vitals:  There were no vitals taken for this visit.     Past Medical History:  Past Medical History:   Diagnosis Date   • Age-related osteoporosis without current pathological fracture 08/26/2013    Osteoporosis   • Encounter for screening mammogram for malignant neoplasm of breast     Visit for screening mammogram   • Morbid (severe) obesity due to excess calories (Multi)     Morbid obesity   • Other conditions influencing health status     DEXA Body Composition Study   • Personal history of other diseases of the circulatory system 08/12/2013    History of hypertension   • Personal history of other diseases of the respiratory system     Personal history of asthma   • Personal history of other endocrine, nutritional and metabolic disease     History of hyperlipidemia   • Unspecified cataract     Cataract of left eye        Past Surgical History:  Past Surgical History:   Procedure Laterality Date   • COLONOSCOPY  07/03/2013    Complete Colonoscopy   • HEMORRHOID SURGERY  01/09/2015    Hemorrhoidectomy   • HYSTERECTOMY  04/24/2013    Hysterectomy   • OTHER SURGICAL HISTORY  05/17/2019    Cataract surgery   • OTHER SURGICAL HISTORY  11/11/2019    Knee replacement       Social History:  Social History     Socioeconomic History   • Marital status:    Tobacco Use   • Smoking status: Never   • Smokeless tobacco: Never   Substance and Sexual Activity   • Alcohol use: Never   • Drug use: Never       Family History:  Family History   Problem Relation Name Age of Onset   • Breast cancer Mother     •  Diabetes Son           Allergies:  Penicillins and Sulfa (sulfonamide antibiotics)    Outpatient Medications:  Current Outpatient Medications   Medication Sig Dispense Refill   • albuterol 90 mcg/actuation inhaler Inhale 2 puffs every 4 hours if needed for wheezing. 8.5 g 1   • amLODIPine (Norvasc) 10 mg tablet Take 1 tablet (10 mg) by mouth once daily. 90 tablet 0   • aspirin 81 mg EC tablet Take 1 tablet (81 mg) by mouth once daily.     • furosemide (Lasix) 40 mg tablet Take 1 tablet (40 mg) by mouth 2 times a day. 60 tablet 0   • ibuprofen 200 mg tablet Take by mouth every 6 hours if needed for mild pain (1 - 3).     • olmesartan (BENIcar) 40 mg tablet Take 1 tablet (40 mg) by mouth once daily. Take olmesartan instead of Losartan 90 tablet 3   • pravastatin (Pravachol) 40 mg tablet Take 1 tablet (40 mg) by mouth once daily at bedtime. 90 tablet 0   • walker (Ultra-Light Rollator) misc Use as directed 1 each 0     No current facility-administered medications for this visit.        Physical Exam:  Cardiovascular:      PMI at left midclavicular line. Normal rate. Regular rhythm. Normal S1. Normal S2.       Murmurs: There is no murmur.      No gallop.  No click. No rub.   Pulses:     Intact distal pulses.   Edema:     Thigh: bilateral 2+ edema of the thigh.     Pretibial: bilateral 2+ edema of the pretibial area.     Ankle: bilateral 2+ edema of the ankle.     Feet: bilateral 2+ edema of the feet.      ROS:  Review of Systems   Constitutional: Negative.   Cardiovascular:  Positive for leg swelling.   Respiratory: Negative.     Skin: Negative.    Musculoskeletal: Negative.    Gastrointestinal: Negative.    Genitourinary: Negative.    Neurological: Negative.           Last Labs:  CBC -  Lab Results   Component Value Date    WBC 11.0 07/08/2024    HGB 11.1 (L) 07/08/2024    HCT 34.1 (L) 07/08/2024    MCV 72 (L) 07/08/2024     07/08/2024       CMP -  Lab Results   Component Value Date    CALCIUM 11.5 (H)  07/08/2024    PHOS 2.6 08/22/2019    PROT 7.2 07/08/2024    ALBUMIN 4.1 07/08/2024    AST 15 07/08/2024    ALT 16 07/08/2024    ALKPHOS 154 (H) 07/08/2024    BILITOT 0.3 07/08/2024       LIPID PANEL -   Lab Results   Component Value Date    CHOL 169 07/08/2024    TRIG 91 07/08/2024    HDL 61.6 07/08/2024    CHHDL 2.7 07/08/2024    LDLF 132 (H) 11/07/2022    VLDL 18 07/08/2024    NHDL 107 07/08/2024       RENAL FUNCTION PANEL -   Lab Results   Component Value Date    GLUCOSE 101 (H) 07/08/2024     07/08/2024    K 4.4 07/08/2024     07/08/2024    CO2 24 07/08/2024    ANIONGAP 13 07/08/2024    BUN 14 07/08/2024    CREATININE 0.73 07/08/2024    CALCIUM 11.5 (H) 07/08/2024    PHOS 2.6 08/22/2019    ALBUMIN 4.1 07/08/2024        Lab Results   Component Value Date    BNP 76 09/03/2020    HGBA1C 6.0 (A) 08/11/2022         Assessment/Plan   Problem List Items Addressed This Visit    None      Impressions     1. Edema. Echo done 10/2019 showed EF 65-70%. Swelling moderate at today's office visit. Patient had negative PVRs when done October 2019. Is unable to wear compression stockings. Will continue lasix 40 mg twice daily and order home care with PT.  Has compression devices for lymphedema.     2. Hypertension. Adequately controlled on current olmesartan 40 mg daily.     3. Hyperlipidemia. FLP from 03/2021 showed chol 162, HDl 50, LDL 93 and trig 90. Continue pravastatin 40 mg daily. FLP done 11/2022, patient was off pravastatin, but has restarted.     4. DJD. Will see ortho and pain management. Had injections to the knees without improvement.     5. ? CAD. Had negative pharmacological nuclear stress test when done April 2010.     6. Hx of covid-19 vaccine #1 and #2.     7. Hx of hemorrhoid surgery 10/2020 and 05/2021.        TAVARES Linton-CNP

## 2025-07-09 ENCOUNTER — APPOINTMENT (OUTPATIENT)
Dept: CARDIOLOGY | Facility: CLINIC | Age: 89
End: 2025-07-09
Payer: MEDICARE

## 2025-08-12 ENCOUNTER — APPOINTMENT (OUTPATIENT)
Dept: PRIMARY CARE | Facility: CLINIC | Age: 89
End: 2025-08-12
Payer: MEDICARE

## 2025-08-12 ENCOUNTER — OFFICE VISIT (OUTPATIENT)
Dept: CARDIOLOGY | Facility: CLINIC | Age: 89
End: 2025-08-12
Payer: MEDICARE

## 2025-08-12 VITALS
WEIGHT: 235 LBS | DIASTOLIC BLOOD PRESSURE: 60 MMHG | SYSTOLIC BLOOD PRESSURE: 142 MMHG | HEIGHT: 62 IN | OXYGEN SATURATION: 97 % | BODY MASS INDEX: 43.24 KG/M2 | HEART RATE: 84 BPM

## 2025-08-12 VITALS
DIASTOLIC BLOOD PRESSURE: 79 MMHG | WEIGHT: 235 LBS | BODY MASS INDEX: 43.24 KG/M2 | SYSTOLIC BLOOD PRESSURE: 153 MMHG | OXYGEN SATURATION: 94 % | HEART RATE: 81 BPM | HEIGHT: 62 IN

## 2025-08-12 DIAGNOSIS — E21.3 HYPERPARATHYROIDISM (MULTI): ICD-10-CM

## 2025-08-12 DIAGNOSIS — M25.561 CHRONIC PAIN OF BOTH KNEES: Primary | ICD-10-CM

## 2025-08-12 DIAGNOSIS — Z00.00 MEDICARE ANNUAL WELLNESS VISIT, SUBSEQUENT: ICD-10-CM

## 2025-08-12 DIAGNOSIS — M79.672 BILATERAL FOOT PAIN: ICD-10-CM

## 2025-08-12 DIAGNOSIS — G89.29 CHRONIC PAIN OF BOTH KNEES: Primary | ICD-10-CM

## 2025-08-12 DIAGNOSIS — E83.52 HYPERCALCEMIA: ICD-10-CM

## 2025-08-12 DIAGNOSIS — E78.5 HYPERLIPIDEMIA, UNSPECIFIED HYPERLIPIDEMIA TYPE: ICD-10-CM

## 2025-08-12 DIAGNOSIS — J45.909 ASTHMA, UNSPECIFIED ASTHMA SEVERITY, UNSPECIFIED WHETHER COMPLICATED, UNSPECIFIED WHETHER PERSISTENT (HHS-HCC): ICD-10-CM

## 2025-08-12 DIAGNOSIS — I10 ESSENTIAL HYPERTENSION: ICD-10-CM

## 2025-08-12 DIAGNOSIS — E03.9 HYPOTHYROIDISM, UNSPECIFIED TYPE: ICD-10-CM

## 2025-08-12 DIAGNOSIS — Z00.00 WELLNESS EXAMINATION: ICD-10-CM

## 2025-08-12 DIAGNOSIS — E55.9 VITAMIN D DEFICIENCY: ICD-10-CM

## 2025-08-12 DIAGNOSIS — M25.562 CHRONIC PAIN OF BOTH KNEES: Primary | ICD-10-CM

## 2025-08-12 DIAGNOSIS — R79.9 ABNORMAL BLOOD CHEMISTRY: Primary | ICD-10-CM

## 2025-08-12 DIAGNOSIS — R60.9 EDEMA: ICD-10-CM

## 2025-08-12 DIAGNOSIS — M79.671 BILATERAL FOOT PAIN: ICD-10-CM

## 2025-08-12 PROCEDURE — 1160F RVW MEDS BY RX/DR IN RCRD: CPT | Performed by: INTERNAL MEDICINE

## 2025-08-12 PROCEDURE — 99397 PER PM REEVAL EST PAT 65+ YR: CPT | Performed by: STUDENT IN AN ORGANIZED HEALTH CARE EDUCATION/TRAINING PROGRAM

## 2025-08-12 PROCEDURE — 1124F ACP DISCUSS-NO DSCNMKR DOCD: CPT | Performed by: STUDENT IN AN ORGANIZED HEALTH CARE EDUCATION/TRAINING PROGRAM

## 2025-08-12 PROCEDURE — 1036F TOBACCO NON-USER: CPT | Performed by: STUDENT IN AN ORGANIZED HEALTH CARE EDUCATION/TRAINING PROGRAM

## 2025-08-12 PROCEDURE — 1126F AMNT PAIN NOTED NONE PRSNT: CPT | Performed by: INTERNAL MEDICINE

## 2025-08-12 PROCEDURE — 1170F FXNL STATUS ASSESSED: CPT | Performed by: STUDENT IN AN ORGANIZED HEALTH CARE EDUCATION/TRAINING PROGRAM

## 2025-08-12 PROCEDURE — 1159F MED LIST DOCD IN RCRD: CPT | Performed by: STUDENT IN AN ORGANIZED HEALTH CARE EDUCATION/TRAINING PROGRAM

## 2025-08-12 PROCEDURE — 3077F SYST BP >= 140 MM HG: CPT | Performed by: STUDENT IN AN ORGANIZED HEALTH CARE EDUCATION/TRAINING PROGRAM

## 2025-08-12 PROCEDURE — 1159F MED LIST DOCD IN RCRD: CPT | Performed by: INTERNAL MEDICINE

## 2025-08-12 PROCEDURE — 99214 OFFICE O/P EST MOD 30 MIN: CPT | Performed by: INTERNAL MEDICINE

## 2025-08-12 PROCEDURE — G2211 COMPLEX E/M VISIT ADD ON: HCPCS | Performed by: INTERNAL MEDICINE

## 2025-08-12 PROCEDURE — 3078F DIAST BP <80 MM HG: CPT | Performed by: INTERNAL MEDICINE

## 2025-08-12 PROCEDURE — G0439 PPPS, SUBSEQ VISIT: HCPCS | Performed by: STUDENT IN AN ORGANIZED HEALTH CARE EDUCATION/TRAINING PROGRAM

## 2025-08-12 PROCEDURE — 1160F RVW MEDS BY RX/DR IN RCRD: CPT | Performed by: STUDENT IN AN ORGANIZED HEALTH CARE EDUCATION/TRAINING PROGRAM

## 2025-08-12 PROCEDURE — 3078F DIAST BP <80 MM HG: CPT | Performed by: STUDENT IN AN ORGANIZED HEALTH CARE EDUCATION/TRAINING PROGRAM

## 2025-08-12 PROCEDURE — 99212 OFFICE O/P EST SF 10 MIN: CPT

## 2025-08-12 PROCEDURE — 99214 OFFICE O/P EST MOD 30 MIN: CPT | Performed by: STUDENT IN AN ORGANIZED HEALTH CARE EDUCATION/TRAINING PROGRAM

## 2025-08-12 PROCEDURE — 3075F SYST BP GE 130 - 139MM HG: CPT | Performed by: INTERNAL MEDICINE

## 2025-08-12 RX ORDER — AMLODIPINE BESYLATE 10 MG/1
10 TABLET ORAL DAILY
Qty: 100 TABLET | Refills: 1 | Status: SHIPPED | OUTPATIENT
Start: 2025-08-12

## 2025-08-12 RX ORDER — ALBUTEROL SULFATE 90 UG/1
2 INHALANT RESPIRATORY (INHALATION) EVERY 4 HOURS PRN
Qty: 8.5 G | Refills: 3 | Status: SHIPPED | OUTPATIENT
Start: 2025-08-12

## 2025-08-12 RX ORDER — PRAVASTATIN SODIUM 40 MG/1
40 TABLET ORAL NIGHTLY
Qty: 100 TABLET | Refills: 1 | Status: SHIPPED | OUTPATIENT
Start: 2025-08-12

## 2025-08-12 RX ORDER — FUROSEMIDE 40 MG/1
40 TABLET ORAL 2 TIMES DAILY
Qty: 180 TABLET | Refills: 1 | Status: SHIPPED | OUTPATIENT
Start: 2025-08-12 | End: 2026-02-08

## 2025-08-12 RX ORDER — OLMESARTAN MEDOXOMIL 40 MG/1
40 TABLET ORAL DAILY
Qty: 100 TABLET | Refills: 1 | Status: SHIPPED | OUTPATIENT
Start: 2025-08-12 | End: 2026-02-28

## 2025-08-12 RX ORDER — PREDNISONE 20 MG/1
40 TABLET ORAL DAILY
Qty: 10 TABLET | Refills: 0 | Status: SHIPPED | OUTPATIENT
Start: 2025-08-12 | End: 2025-08-17

## 2025-08-12 ASSESSMENT — PATIENT HEALTH QUESTIONNAIRE - PHQ9
2. FEELING DOWN, DEPRESSED OR HOPELESS: NOT AT ALL
SUM OF ALL RESPONSES TO PHQ9 QUESTIONS 1 AND 2: 0
1. LITTLE INTEREST OR PLEASURE IN DOING THINGS: NOT AT ALL

## 2025-08-12 ASSESSMENT — ENCOUNTER SYMPTOMS
OCCASIONAL FEELINGS OF UNSTEADINESS: 1
LOSS OF SENSATION IN FEET: 0
NEUROLOGICAL NEGATIVE: 1
DEPRESSION: 0
DEPRESSION: 0
OCCASIONAL FEELINGS OF UNSTEADINESS: 0
LOSS OF SENSATION IN FEET: 0
RESPIRATORY NEGATIVE: 1
GASTROINTESTINAL NEGATIVE: 1
MUSCULOSKELETAL NEGATIVE: 1
CONSTITUTIONAL NEGATIVE: 1

## 2025-08-12 ASSESSMENT — ACTIVITIES OF DAILY LIVING (ADL)
DOING_HOUSEWORK: TOTAL CARE
GROCERY_SHOPPING: TOTAL CARE
BATHING: INDEPENDENT
TAKING_MEDICATION: TOTAL CARE
MANAGING_FINANCES: TOTAL CARE
DRESSING: INDEPENDENT

## 2025-08-12 ASSESSMENT — PAIN SCALES - GENERAL: PAINLEVEL_OUTOF10: 0-NO PAIN

## 2025-08-13 LAB
25(OH)D3+25(OH)D2 SERPL-MCNC: 52 NG/ML (ref 30–100)
ALBUMIN SERPL-MCNC: 4 G/DL (ref 3.6–5.1)
ALP SERPL-CCNC: 166 U/L (ref 37–153)
ALT SERPL-CCNC: 11 U/L (ref 6–29)
ANION GAP SERPL CALCULATED.4IONS-SCNC: 13 MMOL/L (CALC) (ref 7–17)
AST SERPL-CCNC: 11 U/L (ref 10–35)
BILIRUB SERPL-MCNC: 0.3 MG/DL (ref 0.2–1.2)
BUN SERPL-MCNC: 15 MG/DL (ref 7–25)
CALCIUM SERPL-MCNC: 11.2 MG/DL (ref 8.6–10.4)
CHLORIDE SERPL-SCNC: 106 MMOL/L (ref 98–110)
CHOLEST SERPL-MCNC: 145 MG/DL
CHOLEST/HDLC SERPL: 2.6 (CALC)
CO2 SERPL-SCNC: 24 MMOL/L (ref 20–32)
CREAT SERPL-MCNC: 0.88 MG/DL (ref 0.6–0.95)
EGFRCR SERPLBLD CKD-EPI 2021: 63 ML/MIN/1.73M2
ERYTHROCYTE [DISTWIDTH] IN BLOOD BY AUTOMATED COUNT: 17.3 % (ref 11–15)
GLUCOSE SERPL-MCNC: 118 MG/DL (ref 65–99)
HCT VFR BLD AUTO: 38 % (ref 35–45)
HDLC SERPL-MCNC: 56 MG/DL
HGB BLD-MCNC: 11.6 G/DL (ref 11.7–15.5)
LDLC SERPL CALC-MCNC: 73 MG/DL (CALC)
MCH RBC QN AUTO: 24.3 PG (ref 27–33)
MCHC RBC AUTO-ENTMCNC: 30.5 G/DL (ref 32–36)
MCV RBC AUTO: 79.5 FL (ref 80–100)
NONHDLC SERPL-MCNC: 89 MG/DL (CALC)
PLATELET # BLD AUTO: 339 THOUSAND/UL (ref 140–400)
PMV BLD REES-ECKER: 10.5 FL (ref 7.5–12.5)
POTASSIUM SERPL-SCNC: 4 MMOL/L (ref 3.5–5.3)
PROT SERPL-MCNC: 7.5 G/DL (ref 6.1–8.1)
PTH-INTACT SERPL-MCNC: 340 PG/ML (ref 16–77)
RBC # BLD AUTO: 4.78 MILLION/UL (ref 3.8–5.1)
SODIUM SERPL-SCNC: 143 MMOL/L (ref 135–146)
TRIGL SERPL-MCNC: 77 MG/DL
TSH SERPL-ACNC: 1.76 MIU/L (ref 0.4–4.5)
WBC # BLD AUTO: 11.1 THOUSAND/UL (ref 3.8–10.8)

## 2026-02-24 ENCOUNTER — APPOINTMENT (OUTPATIENT)
Dept: PRIMARY CARE | Facility: CLINIC | Age: 89
End: 2026-02-24
Payer: MEDICARE